# Patient Record
Sex: FEMALE | Race: WHITE | NOT HISPANIC OR LATINO | Employment: OTHER | URBAN - METROPOLITAN AREA
[De-identification: names, ages, dates, MRNs, and addresses within clinical notes are randomized per-mention and may not be internally consistent; named-entity substitution may affect disease eponyms.]

---

## 2019-10-11 ENCOUNTER — THERAPY VISIT (OUTPATIENT)
Dept: PHYSICAL THERAPY | Facility: CLINIC | Age: 64
End: 2019-10-11
Payer: COMMERCIAL

## 2019-10-11 DIAGNOSIS — M54.42 RIGHT-SIDED LOW BACK PAIN WITH BILATERAL SCIATICA: ICD-10-CM

## 2019-10-11 DIAGNOSIS — M54.41 RIGHT-SIDED LOW BACK PAIN WITH BILATERAL SCIATICA: ICD-10-CM

## 2019-10-11 PROCEDURE — 97530 THERAPEUTIC ACTIVITIES: CPT | Mod: GP | Performed by: PHYSICAL THERAPIST

## 2019-10-11 PROCEDURE — 97110 THERAPEUTIC EXERCISES: CPT | Mod: GP | Performed by: PHYSICAL THERAPIST

## 2019-10-11 PROCEDURE — 97161 PT EVAL LOW COMPLEX 20 MIN: CPT | Mod: GP | Performed by: PHYSICAL THERAPIST

## 2019-10-11 NOTE — LETTER
[facility]THLETIC [facility] PHYSICAL THERAPY  [address]  [facility] [address]  [phone]    2019    Re: Marie Puente   :   1955  MRN:  [id number]   REFERRING PHYSICIAN:   Tiera Kwong    [facility] PHYSICAL THERAPY    Date of Initial Evaluation:  10/11/2019  Visits:   1  Reason for Referral:  Right-sided low back pain with bilateral sciatica    [facility] Initial Evaluation -- Lumbar  Date: 2019  Marie Puente is a 64 year old female with a lumbar spine condition.   Referral: GP  Work mechanical stresses:    Employment status:   for [employer]  Leisure mechanical stresses: walking 4 x week (1 mile)  Functional disability score (RAHUL/STarT Back):  See flowsheets  VAS score (0-10): 3/10  Patient goals/expectations:  Reduce pain in legs and sit for longer periods without leg pain    HISTORY:  Present symptoms: Rt glut, (B) achiness in legs, Rt ant shin  Pain quality (sharp/shooting/stabbing/aching/burning/cramping):  Achy, burning   Paresthesia (yes/no):  Numbness and coldness feeling middle toes (B)  Present since (onset date): > 6 months.  MD referral date 19.   Symptoms (improving/unchanging/worsening):  worsening.   Symptoms commenced as a result of: No apparent reason   Condition occurred in the following environment:   unknown   Symptoms at onset (back/thigh/leg): Rt glut, legs  Constant symptoms (back/thigh/leg):   Intermittent symptoms (back/thigh/leg): Rt glut, legs F  Symptoms are made worse with the following: Always Sitting, Sometimes Rising, Sometimes Lying, Time of day - Sometimes PM and Always When still   Symptoms are made better with the following: Always Walking and Always On the move  Disturbed sleep (yes/no):  No   Sleeping postures (prone/sup/side R/L): sup, sides  Previous episodes (0/1-5/6-10/11+): 0   Year of first episode:   Previous history:  No  Previous treatments: None        Re: Marie Puente   :   1955    Specific Questions:  Cough/Sneeze/Strain (pos/neg): Neg  Bowel/Bladder (normal/abnormal): Normal  Gait (normal/abnormal): Normal  Medications (nil/NSAIDS/analg/steroids/anticoag/other):  Other - Cholesterol, doxycycline  Medical allergies:  Sulfa  General health (excellent/good/fair/poor):  Good  Pertinent medical history:  Lyme's  Imaging (None/Xray/MRI/Other):  None  Recent or major surgery (yes/no):  No  Night pain (yes/no): No  Accidents (yes/no): No  Unexplained weight loss (yes/no): No  Barriers at home: None  Other red flags: None    EXAMINATION    Posture:   Sitting (good/fair/poor): Fair  Standing (good/fair/poor):Fair  Lordosis (red/acc/normal): Reduced  Correction of posture (better/worse/no effect): Better  Lateral Shift (right/left/nil): Nil  Relevant (yes/no):    Other Observations:     Neurological:  Motor deficit:  Hip Flex Rt 4-/5 Lt 4/5; Quad Rt 5-/5 Lt 5/5; H-S 5/5 (B); DF Rt 4+/5 Lt 5/5; PF (calf raise) 5/5 (B)    Reflexes:  NT  Sensory deficit:  NT    Dural signs:  Neg slump (B)    Movement Loss:   Jameson Mod Min Nil Pain   Flexion    X    Extension  X      Side Gliding R  X   Rt low back   Side Gliding L   X  Rt low back     Test Movements:   During: produces, abolishes, increases, decreases, no effect, centralizing, peripheralizing   After: better, worse, no better, no worse, no effect, centralized, peripheralized          Re: Marie Puente   :   1955    Pretest symptoms standin/10 pain   Symptoms During Symptoms After ROM increased ROM decreased No Effect   FIS        Rep FIS        EIS No Effect    No Effect         Rep EIS No Effect    No Effect    X  Ext and (B) SGIS w/less pain    Inc (B) hip flex, Rt quad, and Rt DF strength     Pretest symptoms lying:     Symptoms During Symptoms After ROM increased ROM decreased No Effect   KALYN        Rep KALYN        EIL        Rep EIL        If required,  pretest symptoms:    Symptoms During Symptoms After ROM increased ROM decreased No Effect   SGIS - R        Rep SGIS - R        SGIS - L        Rep SGIS - L        Static Tests:  Sitting slouched:    Sitting erect:    Standing slouched   Standing erect:    Lying prone in extension:   Long sitting:    Other Tests: NT  Provisional Classification:  Derangement - Asymmetrical, unilateral, symptoms below knee  Principle of Management:  Education:  Posture, specificity of exercise and rationale with repeated movements, centralization  Equipment provided:  None.  Use of rolled towel for posture correction  Mechanical therapy (Y/N):  Y   Extension principle:  EIS x 10-15 reps every 2 hrs            Re: Marie Velasqueznett   :   1955    ASSESSMENT/PLAN:  Patient is a 64 year old female with lumbar complaints.    Patient has the following significant findings with corresponding treatment plan.                Diagnosis 1:  LBP    Pain -  self management, education, directional preference exercise and home program  Decreased ROM/flexibility - manual therapy, therapeutic exercise and home program  Decreased joint mobility - manual therapy, therapeutic exercise and home program  Decreased strength - therapeutic exercise, therapeutic activities and home program  Impaired muscle performance - neuro re-education and home program  Decreased function - therapeutic activities and home program  Impaired posture - neuro re-education and home program  Previous and current functional limitations:  (See Goal Flow Sheet for this information)    Short term and Long term goals: (See Goal Flow Sheet for this information)   Communication ability:  Patient appears to be able to clearly communicate and understand verbal and written communication and follow directions correctly.  Treatment Explanation - The following has been discussed with the patient:   RX ordered/plan of care, Anticipated outcomes, Possible risks and side effects    This  patient would benefit from PT intervention to resume normal activities.   Rehab potential is good.  Frequency:  1 X week, once daily  Duration:  for 6 weeks  Discharge Plan:  Achieve all LTG.  Independent in home treatment program.  Reach maximal therapeutic benefit.    Thank you for your referral.    INQUIRIES        Therapist:  Elias Corado DPT, Cert. MDT  [facility]  [address] [facility] [address]  [facility] [address]  Phone: [phone]  Fax: [phone]

## 2019-10-11 NOTE — PROGRESS NOTES
[facility] Initial Evaluation -- Lumbar    Date: October 11, 2019  Marie Puente is a 64 year old female with a lumbar spine condition.   Referral: GP  Work mechanical stresses:    Employment status:   for [email or web address]  Leisure mechanical stresses: walking 4 x week (1 mile)  Functional disability score (RAHUL/STarT Back):  See flowsheets  VAS score (0-10): 3/10  Patient goals/expectations:  Reduce pain in legs and sit for longer periods without leg pain    HISTORY:    Present symptoms: Rt glut, (B) achiness in legs, Rt ant shin  Pain quality (sharp/shooting/stabbing/aching/burning/cramping):  Achy, burning   Paresthesia (yes/no):  Numbness and coldness feeling middle toes (B)    Present since (onset date): > 6 months.  MD referral date 9.25.19.   Symptoms (improving/unchanging/worsening):  worsening.     Symptoms commenced as a result of: No apparent reason   Condition occurred in the following environment:   unknown     Symptoms at onset (back/thigh/leg): Rt glut, legs  Constant symptoms (back/thigh/leg):   Intermittent symptoms (back/thigh/leg): Rt glut, legs  F  Symptoms are made worse with the following: Always Sitting, Sometimes Rising, Sometimes Lying, Time of day - Sometimes PM and Always When still   Symptoms are made better with the following: Always Walking and Always On the move    Disturbed sleep (yes/no):  No Sleeping postures (prone/sup/side R/L): sup, sides    Previous episodes (0/1-5/6-10/11+): 0 Year of first episode:     Previous history: No  Previous treatments: None      Specific Questions:  Cough/Sneeze/Strain (pos/neg): Neg  Bowel/Bladder (normal/abnormal): Normal  Gait (normal/abnormal): Normal  Medications (nil/NSAIDS/analg/steroids/anticoag/other):  Other - Cholesterol, doxycycline  Medical allergies:  Sulfa  General health (excellent/good/fair/poor):  Good  Pertinent medical history:  Lyme's  Imaging (None/Xray/MRI/Other):  None  Recent or major surgery (yes/no):   No  Night pain (yes/no): No  Accidents (yes/no): No  Unexplained weight loss (yes/no): No  Barriers at home: None  Other red flags: None    EXAMINATION    Posture:   Sitting (good/fair/poor): Fair  Standing (good/fair/poor):Fair  Lordosis (red/acc/normal): Reduced  Correction of posture (better/worse/no effect): Better    Lateral Shift (right/left/nil): Nil  Relevant (yes/no):    Other Observations:     Neurological:    Motor deficit:  Hip Flex Rt 4-/5 Lt 4/5; Quad Rt 5-/5 Lt 5/5; H-S 5 (B); DF Rt 4+/5 Lt 5/5; PF (calf raise)  (B)    Reflexes:  NT  Sensory deficit:  NT  Dural signs:  Neg slump (B)    Movement Loss:   Jameson Mod Min Nil Pain   Flexion    X    Extension  X      Side Gliding R  X   Rt low back   Side Gliding L   X  Rt low back     Test Movements:   During: produces, abolishes, increases, decreases, no effect, centralizing, peripheralizing   After: better, worse, no better, no worse, no effect, centralized, peripheralized    Pretest symptoms standin/10 pain   Symptoms During Symptoms After ROM increased ROM decreased No Effect   FIS        Rep FIS        EIS No Effect    No Effect         Rep EIS No Effect    No Effect    X  Ext and (B) SGIS w/less pain    Inc (B) hip flex, Rt quad, and Rt DF strength     Pretest symptoms lying:     Symptoms During Symptoms After ROM increased ROM decreased No Effect   KALYN        Rep KALYN        EIL        Rep EIL        If required, pretest symptoms:    Symptoms During Symptoms After ROM increased ROM decreased No Effect   SGIS - R        Rep SGIS - R        SGIS - L        Rep SGIS - L          Static Tests:  Sitting slouched:    Sitting erect:    Standing slouched   Standing erect:    Lying prone in extension:   Long sitting:      Other Tests: NT    Provisional Classification:  Derangement - Asymmetrical, unilateral, symptoms below knee    Principle of Management:  Education:  Posture, specificity of exercise and rationale with repeated movements,  centralization  Equipment provided:  None.  Use of rolled towel for posture correction  Mechanical therapy (Y/N):  Y   Extension principle:  EIS x 10-15 reps every 2 hrs    ASSESSMENT/PLAN:    Patient is a 64 year old female with lumbar complaints.    Patient has the following significant findings with corresponding treatment plan.                Diagnosis 1:  LBP    Pain -  self management, education, directional preference exercise and home program  Decreased ROM/flexibility - manual therapy, therapeutic exercise and home program  Decreased joint mobility - manual therapy, therapeutic exercise and home program  Decreased strength - therapeutic exercise, therapeutic activities and home program  Impaired muscle performance - neuro re-education and home program  Decreased function - therapeutic activities and home program  Impaired posture - neuro re-education and home program    Previous and current functional limitations:  (See Goal Flow Sheet for this information)    Short term and Long term goals: (See Goal Flow Sheet for this information)     Communication ability:  Patient appears to be able to clearly communicate and understand verbal and written communication and follow directions correctly.  Treatment Explanation - The following has been discussed with the patient:   RX ordered/plan of care  Anticipated outcomes  Possible risks and side effects  This patient would benefit from PT intervention to resume normal activities.   Rehab potential is good.    Frequency:  1 X week, once daily  Duration:  for 6 weeks  Discharge Plan:  Achieve all LTG.  Independent in home treatment program.  Reach maximal therapeutic benefit.    Please refer to the daily flowsheet for treatment today, total treatment time and time spent performing 1:1 timed codes.

## 2019-10-14 PROBLEM — M54.41 RIGHT-SIDED LOW BACK PAIN WITH BILATERAL SCIATICA: Status: ACTIVE | Noted: 2019-10-14

## 2019-10-14 PROBLEM — M54.42 RIGHT-SIDED LOW BACK PAIN WITH BILATERAL SCIATICA: Status: ACTIVE | Noted: 2019-10-14

## 2019-10-16 ENCOUNTER — THERAPY VISIT (OUTPATIENT)
Dept: PHYSICAL THERAPY | Facility: CLINIC | Age: 64
End: 2019-10-16
Payer: COMMERCIAL

## 2019-10-16 DIAGNOSIS — M54.42 RIGHT-SIDED LOW BACK PAIN WITH BILATERAL SCIATICA: ICD-10-CM

## 2019-10-16 DIAGNOSIS — M54.41 RIGHT-SIDED LOW BACK PAIN WITH BILATERAL SCIATICA: ICD-10-CM

## 2019-10-16 PROCEDURE — 97110 THERAPEUTIC EXERCISES: CPT | Mod: GP | Performed by: PHYSICAL THERAPIST

## 2019-10-16 PROCEDURE — 97530 THERAPEUTIC ACTIVITIES: CPT | Mod: GP | Performed by: PHYSICAL THERAPIST

## 2019-10-24 ENCOUNTER — THERAPY VISIT (OUTPATIENT)
Dept: PHYSICAL THERAPY | Facility: CLINIC | Age: 64
End: 2019-10-24
Payer: COMMERCIAL

## 2019-10-24 DIAGNOSIS — M54.42 RIGHT-SIDED LOW BACK PAIN WITH BILATERAL SCIATICA: ICD-10-CM

## 2019-10-24 DIAGNOSIS — M54.41 RIGHT-SIDED LOW BACK PAIN WITH BILATERAL SCIATICA: ICD-10-CM

## 2019-10-24 PROCEDURE — 97110 THERAPEUTIC EXERCISES: CPT | Mod: GP | Performed by: PHYSICAL THERAPIST

## 2019-10-24 PROCEDURE — 97140 MANUAL THERAPY 1/> REGIONS: CPT | Mod: GP | Performed by: PHYSICAL THERAPIST

## 2019-10-24 PROCEDURE — 97530 THERAPEUTIC ACTIVITIES: CPT | Mod: GP | Performed by: PHYSICAL THERAPIST

## 2019-11-07 ENCOUNTER — THERAPY VISIT (OUTPATIENT)
Dept: PHYSICAL THERAPY | Facility: CLINIC | Age: 64
End: 2019-11-07
Payer: COMMERCIAL

## 2019-11-07 DIAGNOSIS — M54.41 RIGHT-SIDED LOW BACK PAIN WITH BILATERAL SCIATICA: ICD-10-CM

## 2019-11-07 DIAGNOSIS — M54.42 RIGHT-SIDED LOW BACK PAIN WITH BILATERAL SCIATICA: ICD-10-CM

## 2019-11-07 PROCEDURE — 97530 THERAPEUTIC ACTIVITIES: CPT | Mod: GP | Performed by: PHYSICAL THERAPIST

## 2019-11-07 PROCEDURE — 97110 THERAPEUTIC EXERCISES: CPT | Mod: GP | Performed by: PHYSICAL THERAPIST

## 2019-11-07 NOTE — PROGRESS NOTES
DISCHARGE REPORT    Progress reporting period is from 10.11.19 to 11.7.19.       SUBJECTIVE  Subjective changes noted by patient:  Pt reports pain is much better overall.  Only pain she has been feeling is just the slight burning sensation in Rt ant shin after driving but as soon as she gets out of the car it goes away.  Is still adjusting things with car seat and positions.  Has still been consistent with HEP     Current Pain level: 0/10.     Initial Pain level: 3/10.   Changes in function:  Yes (See Goal flowsheet attached for changes in current functional level)  Adverse reaction to treatment or activity: None    OBJECTIVE  Objective: L/S AROM:  Flex WNL; Ext Min loss; SG Rt Min loss Lt WNL.  Ext ROM and Rt SGIS improve after repeated EIS/EIL.  Myotomes:  5/5 (B) throughout.  Slump:  Neg (B).      ASSESSMENT/PLAN  Updated problem list and treatment plan:   Diagnosis 1:  LBP    Decreased ROM/flexibility - therapeutic exercise and home program  Decreased joint mobility - therapeutic exercise and home program  Impaired muscle performance - home program  STG/LTGs have been met or progress has been made towards goals:  Yes (See Goal flow sheet completed today.)  Assessment of Progress: The patient's condition is improving.  The patient has met all of their long term goals.  Self Management Plans:  Patient is independent in a home treatment program.  Patient is independent in self management of symptoms.  I have re-evaluated this patient and find that the nature, scope, duration and intensity of the therapy is appropriate for the medical condition of the patient.  Marie continues to require the following intervention to meet STG and LTG's:  PT intervention is no longer required to meet STG/LTG.    Recommendations:  This patient is ready to be discharged from therapy and continue their home treatment program.

## 2019-11-07 NOTE — LETTER
[facility] ATHLETIC [facility] PHYSICAL THERAPY  [address]  [facility] [address]  [phone]    2019    Re: Marie Puente   :   1955  MRN:  [id number]   REFERRING PHYSICIAN:   Tiera Kwong    [facility] ATHLETIC [facility] PHYSICAL THERAPY    Date of Initial Evaluation:  10/11/2019  Visits:   4  Reason for Referral:  Right-sided low back pain with bilateral sciatica    DISCHARGE REPORT:  Progress reporting period is from 10.11.19 to 19.       SUBJECTIVE  Subjective changes noted by patient:  Pt reports pain is much better overall.  Only pain she has been feeling is just the slight burning sensation in Rt ant shin after driving but as soon as she gets out of the car it goes away.  Is still adjusting things with car seat and positions.  Has still been consistent with HEP     Current Pain level: 0/10.   Initial Pain level: 3/10.   Changes in function:  Yes (See Goal flowsheet attached for changes in current functional level). Adverse reaction to treatment or activity: None    OBJECTIVE  Objective: L/S AROM:  Flex WNL; Ext Min loss; SG Rt Min loss Lt WNL.  Ext ROM and Rt SGIS improve after repeated EIS/EIL.  Myotomes:  5/5 (B) throughout.  Slump:  Neg (B).    ASSESSMENT/PLAN  Updated problem list and treatment plan:   Diagnosis 1:  LBP  Decreased ROM/flexibility - therapeutic exercise and home program  Decreased joint mobility - therapeutic exercise and home program  Impaired muscle performance - home program  STG/LTGs have been met or progress has been made towards goals:  Yes (See Goal flow sheet completed today.)  Assessment of Progress: The patient's condition is improving.  The patient has met all of their long term goals.  Self Management Plans:  Patient is independent in a home treatment program.  Patient is independent in self management of symptoms.  I have re-evaluated this patient and find that the nature, scope, duration and  intensity of the therapy is appropriate for the medical condition of the patient.  Marie continues to require the following intervention to meet STG and LTG's:  PT intervention is no longer required to meet STG/LTG.          Re: Marie Puente   :   1955    Recommendations:  This patient is ready to be discharged from therapy and continue their home treatment program.    Thank you for your referral.    INQUIRIES        Therapist:   Elias Corado DPT, Cert. MDT  [facility] PHYSICAL THERAPY  [address] [facility] [address]  [facility] [address]  Phone: [phone]  Fax: [phone]

## 2019-12-12 ENCOUNTER — THERAPY VISIT (OUTPATIENT)
Dept: PHYSICAL THERAPY | Facility: CLINIC | Age: 64
End: 2019-12-12
Payer: COMMERCIAL

## 2019-12-12 DIAGNOSIS — M25.561 ACUTE PAIN OF RIGHT KNEE: ICD-10-CM

## 2019-12-12 DIAGNOSIS — M54.42 RIGHT-SIDED LOW BACK PAIN WITH BILATERAL SCIATICA: Primary | ICD-10-CM

## 2019-12-12 DIAGNOSIS — M54.41 RIGHT-SIDED LOW BACK PAIN WITH BILATERAL SCIATICA: Primary | ICD-10-CM

## 2019-12-12 PROCEDURE — 97110 THERAPEUTIC EXERCISES: CPT | Mod: GP | Performed by: PHYSICAL THERAPIST

## 2019-12-12 PROCEDURE — 97530 THERAPEUTIC ACTIVITIES: CPT | Mod: GP | Performed by: PHYSICAL THERAPIST

## 2019-12-12 NOTE — PROGRESS NOTES
PROGRESS  REPORT    Progress reporting period is from 11.7.19 to 12.12.19.       SUBJECTIVE  Subjective changes noted by patient:  Pt returns to PT after last seen 5 weeks ago.  Reports that things had been going well and still doing stretch 3 x daily.  Was having no real Rt lower leg pain.  Then about 2 weeks ago had insidious onset of Rt knee pain and swelling for JORGE.  Pain has resolved with elevating and icing but is still feeling tightness and discomfort in back of knee along with some residual swelling.  Feels it with walking, going up/down stairs and rising from chair.    Current Pain level: 2/10.     Initial Pain level: 3/10.   Changes in function:  Yes (See Goal flowsheet attached for changes in current functional level)  Adverse reaction to treatment or activity: None    OBJECTIVE  Objective: L/S AROM:  Flex WNL; Ext WNL; SG WNL (B).  Strength:  Quad Rt 4+/5 (+), Lt 5/5; H-S Rt 5-/5 (+), Lt 5/5.  ROM:  Rt Knee 5-122 deg, Lt knee 0-138 deg.  Repeated movements:  Lumbar EIS - NE, NE, Inc H-S strength and knee flex ROM (125 deg).  NE Quad strength, Ext ROM.  Active Knee Ext - NE, NE, Inc Quad strength, Knee ROM 0-130 deg.  Seated knee ext w/self OP - NE, NE, Inc ROM (2-0-132 deg).      ASSESSMENT/PLAN  Updated problem list and treatment plan:   Diagnosis 1:  LBP/Rt Knee Pain    Pain -  self management, education, directional preference exercise and home program  Decreased ROM/flexibility - manual therapy, therapeutic exercise and home program  Decreased joint mobility - manual therapy, therapeutic exercise and home program  Decreased strength - therapeutic exercise, therapeutic activities and home program  Impaired muscle performance - neuro re-education and home program  Decreased function - therapeutic activities and home program  STG/LTGs have been met or progress has been made towards goals:  Yes (See Goal flow sheet completed today.)  Assessment of Progress: The patient has had set backs in their  progress.  Self Management Plans:  Patient has been instructed in a home treatment program.  Patient  has been instructed in self management of symptoms.  I have re-evaluated this patient and find that the nature, scope, duration and intensity of the therapy is appropriate for the medical condition of the patient.  Marie continues to require the following intervention to meet STG and LTG's:  PT    Recommendations:  This patient would benefit from continued therapy.     Frequency:  1 X week, once daily  Duration:  for 2 weeks tapering to 2 x month x 1 month

## 2019-12-12 NOTE — LETTER
[facility] ATHLETIC [facility] PHYSICAL THER  [address]  [place] [address]  [phone]    2019    Re: Marie Puente   :   1955  MRN:  [id number]   REFERRING PHYSICIAN:   Tiera Kwong    [facility] ATHLETIC [facility] PHYSICAL THER  Date of Initial Evaluation: 10/11/2019  Visits:  Rxs Used: 5  Reason for Referral:     Acute pain of right knee  Right-sided low back pain with bilateral sciatica    EVALUATION SUMMARY    PROGRESS  REPORT  Progress reporting period is from 19 to 19.       SUBJECTIVE  Subjective changes noted by patient:  Pt returns to PT after last seen 5 weeks ago.  Reports that things had been going well and still doing stretch 3 x daily.  Was having no real Rt lower leg pain.  Then about 2 weeks ago had insidious onset of Rt knee pain and swelling for JORGE.  Pain has resolved with elevating and icing but is still feeling tightness and discomfort in back of knee along with some residual swelling.  Feels it with walking, going up/down stairs and rising from chair.    Current Pain level: 2/10.     Initial Pain level: 3/10.   Changes in function:  Yes (See Goal flowsheet attached for changes in current functional level)  Adverse reaction to treatment or activity: None    OBJECTIVE  Objective: L/S AROM:  Flex WNL; Ext WNL; SG WNL (B).  Strength:  Quad Rt 4+/5 (+), Lt 5/5; H-S Rt 5-/5 (+), Lt 5/5.  ROM:  Rt Knee 5-122 deg, Lt knee 0-138 deg.  Repeated movements:  Lumbar EIS - NE, NE, Inc H-S strength and knee flex ROM (125 deg).  NE Quad strength, Ext ROM.  Active Knee Ext - NE, NE, Inc Quad strength, Knee ROM 0-130 deg.  Seated knee ext w/self OP - NE, NE, Inc ROM (2-0-132 deg).      ASSESSMENT/PLAN  Updated problem list and treatment plan:   Diagnosis 1:  LBP/Rt Knee Pain    Pain -  self management, education, directional preference exercise and home program  Decreased ROM/flexibility - manual therapy, therapeutic  exercise and home program  Decreased joint mobility - manual therapy, therapeutic exercise and home program  Decreased strength - therapeutic exercise, therapeutic activities and home program  Impaired muscle performance - neuro re-education and home program      Re: Marie Puente   :   1955    Decreased function - therapeutic activities and home program  STG/LTGs have been met or progress has been made towards goals:  Yes (See Goal flow sheet completed today.)  Assessment of Progress: The patient has had set backs in their progress.  Self Management Plans:  Patient has been instructed in a home treatment program.  Patient  has been instructed in self management of symptoms.  I have re-evaluated this patient and find that the nature, scope, duration and intensity of the therapy is appropriate for the medical condition of the patient.  Marie continues to require the following intervention to meet STG and LTG's:  PT    Recommendations:  This patient would benefit from continued therapy.     Frequency:  1 X week, once daily  Duration:  for 2 weeks tapering to 2 x month x 1 month        Thank you for your referral.    INQUIRIES  Therapist: Ritchie Corado, PT, Cert MDT  [facility] PH[facility]ICAL THER  [address] [facility] [address]  [facility] [address]  Phone: [phone]  Fax: [phone]

## 2019-12-19 ENCOUNTER — THERAPY VISIT (OUTPATIENT)
Dept: PHYSICAL THERAPY | Facility: CLINIC | Age: 64
End: 2019-12-19
Payer: COMMERCIAL

## 2019-12-19 DIAGNOSIS — M54.41 RIGHT-SIDED LOW BACK PAIN WITH BILATERAL SCIATICA: ICD-10-CM

## 2019-12-19 DIAGNOSIS — M54.42 RIGHT-SIDED LOW BACK PAIN WITH BILATERAL SCIATICA: ICD-10-CM

## 2019-12-19 DIAGNOSIS — M25.561 ACUTE PAIN OF RIGHT KNEE: ICD-10-CM

## 2019-12-19 PROCEDURE — 97110 THERAPEUTIC EXERCISES: CPT | Mod: GP | Performed by: PHYSICAL THERAPIST

## 2021-02-05 ENCOUNTER — THERAPY VISIT (OUTPATIENT)
Dept: PHYSICAL THERAPY | Facility: CLINIC | Age: 66
End: 2021-02-05
Payer: COMMERCIAL

## 2021-02-05 DIAGNOSIS — M75.41 IMPINGEMENT SYNDROME OF SHOULDER REGION, RIGHT: ICD-10-CM

## 2021-02-05 PROCEDURE — 97110 THERAPEUTIC EXERCISES: CPT | Mod: GP | Performed by: PHYSICAL THERAPIST

## 2021-02-05 PROCEDURE — 97161 PT EVAL LOW COMPLEX 20 MIN: CPT | Mod: GP | Performed by: PHYSICAL THERAPIST

## 2021-02-05 NOTE — PROGRESS NOTES
[facility] Initial Evaluation  Subjective:  Patient is referred with c/o R shoulder pain which began in the summer of 2020 after a long session of kayaking. She continues to note pain with reaching out to the side or overhead and notes sharp, catching pain. No dx testing to this point. DOI 7/4/20.    The history is provided by the patient.   Therapist Generated HPI Evaluation         Type of problem:  Right shoulder.    This is a new condition.  Condition occurred with:  Repetition/overuse.  Where condition occurred: during recreation/sport.  Patient reports pain:  In the joint and lateral.  Pain is described as sharp and is intermittent.  Pain is worse during the day.  Since onset symptoms are unchanged.  Associated symptoms:  Catching, loss of strength and painful arc. Symptoms are exacerbated by using arm at shoulder level, using arm overhead, using arm behind back and lifting  and relieved by rest.      Barriers include:  None as reported by patient.    Patient Health History  Marie Puente being seen for R shoulder pain.          Pain is reported as 8/10 on pain scale.  General health as reported by patient is good.  Pertinent medical history includes: asthma.   Red flags:  None as reported by patient.   Other medical allergies details: sulfa.   Surgeries include:  None.                                               Objective:  System                   Shoulder Evaluation:  ROM:  AROM:  normal                            PROM:  normal                                Strength:    Flexion: Left:5/5   Pain:    Right: 5/5      Pain:  +    Abduction:  Left: 5/5  Pain:    Right: 5/5      Pain:+    Internal Rotation:  Left:5/5     Pain:    Right: 5/5     Pain:  External Rotation:   Left:5/5     Pain:   Right:3+/5      Pain:++            Stability Testing:  not assessed      Special Tests:      Right shoulder positive for the following special tests:Impingement  Palpation:      Right shoulder  tenderness present at: Supraspinatus and Infraspinatus  Mobility Tests:  normal                                                 General     ROS    Assessment/Plan:    Patient is a 65 year old female with right side shoulder complaints.    Patient has the following significant findings with corresponding treatment plan.                Diagnosis 1:  Right shoulder impingement syndrome  Pain -  splint/taping/bracing/orthotics, self management, education and home program  Decreased strength - therapeutic exercise, therapeutic activities and home program  Impaired muscle performance - neuro re-education and home program  Decreased function - therapeutic activities and home program    Therapy Evaluation Codes:   1) History comprised of:   Personal factors that impact the plan of care:      Time since onset of symptoms.    Comorbidity factors that impact the plan of care are:      None.     Medications impacting care: None.  2) Examination of Body Systems comprised of:   Body structures and functions that impact the plan of care:      Shoulder.   Activity limitations that impact the plan of care are:      Dressing, Lifting and reaching overhead or out to the side.  3) Clinical presentation characteristics are:   Stable/Uncomplicated.  4) Decision-Making    Low complexity using standardized patient assessment instrument and/or measureable assessment of functional outcome.  Cumulative Therapy Evaluation is: Low complexity.    Previous and current functional limitations:  (See Goal Flow Sheet for this information)    Short term and Long term goals: (See Goal Flow Sheet for this information)     Communication ability:  Patient appears to be able to clearly communicate and understand verbal and written communication and follow directions correctly.  Treatment Explanation - The following has been discussed with the patient:   RX ordered/plan of care  Anticipated outcomes  Possible risks and side effects  This patient would  benefit from PT intervention to resume normal activities.   Rehab potential is excellent.    Frequency:  1 X week, once daily  Duration:  for 6 weeks  Discharge Plan:  Achieve all LTG.  Independent in home treatment program.  Reach maximal therapeutic benefit.    Please refer to the daily flowsheet for treatment today, total treatment time and time spent performing 1:1 timed codes.

## 2021-02-05 NOTE — LETTER
[facility] PHYSICAL THERAPY  [address]   [place] [address]  [phone]    2021    Re: Marie Puente   :   1955  MRN:  [id number]   REFERRING PHYSICIAN:   Tiera Kwong    [facility] PHYSICAL THERAPY    Date of Initial Evaluation:  2021  Visits:  Rxs Used: 1  Reason for Referral:  Impingement syndrome of shoulder region, right    EVALUATION SUMMARY    [facility] Initial Evaluation  Subjective:  Patient is referred with c/o R shoulder pain which began in the summer of  after a long session of kayaking. She continues to note pain with reaching out to the side or overhead and notes sharp, catching pain. No dx testing to this point. DOI 20.  The history is provided by the patient.   Patient Health History  Pain is reported as 8/10 on pain scale.  General health as reported by patient is good.  Pertinent medical history includes: asthma, cancer and other (High Cholesterol, Diverticulitis).   Medical allergies: other (Sulfa).   Surgeries include:  Cancer surgery (Skin/Mohs).    Current medications:  Other (Atorvastatin, Low dose Asprine).    Current occupation is retired.   Other job/home tasks details: Caring for grandchildren.     Therapist Generated HPI Evaluation  Type of problem:  Right shoulder.  This is a new condition.  Condition occurred with:  Repetition/overuse.  Where condition occurred: during recreation/sport.  Patient reports pain:  In the joint and lateral.  Pain is described as sharp and is intermittent.  Pain is worse during the day.  Since onset symptoms are unchanged.  Associated symptoms:  Catching, loss of strength and painful arc. Symptoms are exacerbated by using arm at shoulder level, using arm overhead, using arm behind back and lifting  and relieved by rest.  Barriers include:  None as reported by patient.    Patient Health History  Marie GRECO  Kwame being seen for R shoulder pain.    Pain is reported as 8/10 on pain scale.  General health as reported by patient is good.  Re: Marie Puente   :   1955      Pertinent medical history includes: asthma.   Red flags:  None as reported by patient.  Other medical allergies details: sulfa.   Surgeries include:  None.        Shoulder Evaluation:  AROM:  normal  PROM:  normal  Strength:    Flexion: Left:5/5   Pain:    Right: 5/5      Pain:  +  Abduction:  Left: 5/5  Pain:    Right: 5/5      Pain:+  Internal Rotation:  Left:5/5     Pain:    Right: 5/5     Pain:  External Rotation:   Left:5/5     Pain:   Right:3+/5      Pain:++    Stability Testing:  not assessed  Special Tests:    Right shoulder positive for the following special tests:Impingement  Palpation:    Right shoulder tenderness present at: Supraspinatus and Infraspinatus  Mobility Tests:  normal    Assessment/Plan:    Patient is a 65 year old female with right side shoulder complaints.    Patient has the following significant findings with corresponding treatment plan.                Diagnosis 1:  Right shoulder impingement syndrome  Pain -  splint/taping/bracing/orthotics, self management, education and home program  Decreased strength - therapeutic exercise, therapeutic activities and home program  Impaired muscle performance - neuro re-education and home program  Decreased function - therapeutic activities and home program    Therapy Evaluation Codes:   1) History comprised of:   Personal factors that impact the plan of care:      Time since onset of symptoms.    Comorbidity factors that impact the plan of care are:      None.     Medications impacting care: None.  2) Examination of Body Systems comprised of:   Body structures and functions that impact the plan of care:      Shoulder.   Activity limitations that impact the plan of care are:      Dressing, Lifting and reaching overhead or out to the side.  3) Clinical presentation  characteristics are:   Stable/Uncomplicated.  4) Decision-Making    Low complexity using standardized patient assessment instrument and/or measureable assessment of functional outcome.  Cumulative Therapy Evaluation is: Low complexity.  Previous and current functional limitations:  (See Goal Flow Sheet for this information)    Short term and Long term goals: (See Goal Flow Sheet for this information)   Communication ability:  Patient appears to be able to clearly communicate and  Re: Marie Puente   :   1955        understand verbal and written communication and follow directions correctly.  Treatment Explanation - The following has been discussed with the patient:   RX ordered/plan of care, Anticipated outcomes, Possible risks and side effects    This patient would benefit from PT intervention to resume normal activities.   Rehab potential is excellent.  Frequency:  1 X week, once daily  Duration:  for 6 weeks  Discharge Plan:  Achieve all LTG.  Independent in home treatment program.  Reach maximal therapeutic benefit.    Thank you for your referral.      INQUIRIES  Therapist: Mateus Hay, PT  [facility] [place] [facility]  [address] [facility] [address]  [place] [address]  Phone: [phone]  Fax: [phone]

## 2021-02-05 NOTE — PROGRESS NOTES
[facility] Initial Evaluation  Subjective:    Patient Health History         Pain is reported as 8/10 on pain scale.  General health as reported by patient is good.  Pertinent medical history includes: asthma, cancer and other (High Cholesterol, Diverticulitis).     Medical allergies: other (Sulfa).   Surgeries include:  Cancer surgery (Skin/Mohs).    Current medications:  Other (Atorvastatin, Low dose Asprine).    Current occupation is retired.      Other job/home tasks details: Caring for grandchildren.                                  Objective:  System    Physical Exam    General     ROS    Assessment/Plan:

## 2021-02-19 ENCOUNTER — THERAPY VISIT (OUTPATIENT)
Dept: PHYSICAL THERAPY | Facility: CLINIC | Age: 66
End: 2021-02-19
Payer: COMMERCIAL

## 2021-02-19 DIAGNOSIS — M75.41 IMPINGEMENT SYNDROME OF SHOULDER REGION, RIGHT: ICD-10-CM

## 2021-02-19 PROCEDURE — 97110 THERAPEUTIC EXERCISES: CPT | Mod: GP | Performed by: PHYSICAL THERAPY ASSISTANT

## 2021-02-25 ENCOUNTER — THERAPY VISIT (OUTPATIENT)
Dept: PHYSICAL THERAPY | Facility: CLINIC | Age: 66
End: 2021-02-25
Payer: COMMERCIAL

## 2021-02-25 DIAGNOSIS — M75.41 IMPINGEMENT SYNDROME OF SHOULDER REGION, RIGHT: ICD-10-CM

## 2021-02-25 PROCEDURE — 97110 THERAPEUTIC EXERCISES: CPT | Mod: GP | Performed by: PHYSICAL THERAPY ASSISTANT

## 2021-03-05 ENCOUNTER — THERAPY VISIT (OUTPATIENT)
Dept: PHYSICAL THERAPY | Facility: CLINIC | Age: 66
End: 2021-03-05
Payer: COMMERCIAL

## 2021-03-05 DIAGNOSIS — M75.41 IMPINGEMENT SYNDROME OF SHOULDER REGION, RIGHT: ICD-10-CM

## 2021-03-05 PROCEDURE — 97110 THERAPEUTIC EXERCISES: CPT | Mod: GP | Performed by: PHYSICAL THERAPY ASSISTANT

## 2021-03-11 ENCOUNTER — THERAPY VISIT (OUTPATIENT)
Dept: PHYSICAL THERAPY | Facility: CLINIC | Age: 66
End: 2021-03-11
Payer: COMMERCIAL

## 2021-03-11 DIAGNOSIS — M75.41 IMPINGEMENT SYNDROME OF SHOULDER REGION, RIGHT: ICD-10-CM

## 2021-03-11 PROCEDURE — 97110 THERAPEUTIC EXERCISES: CPT | Mod: GP | Performed by: PHYSICAL THERAPY ASSISTANT

## 2021-03-19 ENCOUNTER — THERAPY VISIT (OUTPATIENT)
Dept: PHYSICAL THERAPY | Facility: CLINIC | Age: 66
End: 2021-03-19
Payer: COMMERCIAL

## 2021-03-19 DIAGNOSIS — M75.41 IMPINGEMENT SYNDROME OF SHOULDER REGION, RIGHT: ICD-10-CM

## 2021-03-19 PROCEDURE — 97110 THERAPEUTIC EXERCISES: CPT | Mod: GP | Performed by: PHYSICAL THERAPY ASSISTANT

## 2021-03-26 ENCOUNTER — THERAPY VISIT (OUTPATIENT)
Dept: PHYSICAL THERAPY | Facility: CLINIC | Age: 66
End: 2021-03-26
Payer: COMMERCIAL

## 2021-03-26 DIAGNOSIS — M75.41 IMPINGEMENT SYNDROME OF SHOULDER REGION, RIGHT: Primary | ICD-10-CM

## 2021-03-26 PROCEDURE — 97110 THERAPEUTIC EXERCISES: CPT | Mod: GP | Performed by: PHYSICAL THERAPIST

## 2021-03-26 PROCEDURE — 97530 THERAPEUTIC ACTIVITIES: CPT | Mod: GP | Performed by: PHYSICAL THERAPIST

## 2021-04-02 ENCOUNTER — THERAPY VISIT (OUTPATIENT)
Dept: PHYSICAL THERAPY | Facility: CLINIC | Age: 66
End: 2021-04-02
Payer: COMMERCIAL

## 2021-04-02 DIAGNOSIS — M75.41 IMPINGEMENT SYNDROME OF SHOULDER REGION, RIGHT: ICD-10-CM

## 2021-04-02 PROCEDURE — 97530 THERAPEUTIC ACTIVITIES: CPT | Mod: GP | Performed by: PHYSICAL THERAPY ASSISTANT

## 2021-04-02 PROCEDURE — 97110 THERAPEUTIC EXERCISES: CPT | Mod: GP | Performed by: PHYSICAL THERAPY ASSISTANT

## 2021-04-08 ENCOUNTER — THERAPY VISIT (OUTPATIENT)
Dept: PHYSICAL THERAPY | Facility: CLINIC | Age: 66
End: 2021-04-08
Payer: COMMERCIAL

## 2021-04-08 DIAGNOSIS — M75.41 IMPINGEMENT SYNDROME OF SHOULDER REGION, RIGHT: ICD-10-CM

## 2021-04-08 PROCEDURE — 97530 THERAPEUTIC ACTIVITIES: CPT | Mod: GP | Performed by: PHYSICAL THERAPY ASSISTANT

## 2021-04-08 PROCEDURE — 97110 THERAPEUTIC EXERCISES: CPT | Mod: GP | Performed by: PHYSICAL THERAPY ASSISTANT

## 2021-04-23 ENCOUNTER — THERAPY VISIT (OUTPATIENT)
Dept: PHYSICAL THERAPY | Facility: CLINIC | Age: 66
End: 2021-04-23
Payer: COMMERCIAL

## 2021-04-23 DIAGNOSIS — M75.41 IMPINGEMENT SYNDROME OF SHOULDER REGION, RIGHT: ICD-10-CM

## 2021-04-23 PROCEDURE — 97110 THERAPEUTIC EXERCISES: CPT | Mod: GP | Performed by: PHYSICAL THERAPY ASSISTANT

## 2021-04-23 PROCEDURE — 97530 THERAPEUTIC ACTIVITIES: CPT | Mod: GP | Performed by: PHYSICAL THERAPY ASSISTANT

## 2021-04-23 NOTE — PROGRESS NOTES
Subjective:  HPI  Physical Exam                    Objective:  System    Physical Exam    General     ROS    Assessment/Plan: DISCHARGE  REPORT    Progress reporting period is from 2/4/2021 to 4/23/2021..       SUBJECTIVE   Pt reports minimal pain in shldr with daily activities especially with OH reaching. When shldr is a little sore is able to decrease her pain with IR stretching. Is able to lift grandchildren with minimal pain now.     Current pain level is 0/10 Current Pain level: 0/10.      Initial Pain level: 8/10.   Changes in function:  Yes (See Goal flowsheet attached for changes in current functional level)  Adverse reaction to treatment or activity: None    OBJECTIVE    Objective: AROM WNL with no PDM with abd today. Strength WNL and painfree with resisted testing. Demonstrates good technique with her HEP. Has been instructed in self progression of wts with her strengthening ex.      ASSESSMENT/PLAN  Updated problem list and treatment plan: Diagnosis 1:  R shoulder pain   STG/LTGs have been met or progress has been made towards goals:  Yes (See Goal flow sheet completed today.)  Assessment of Progress: The patient's condition is improving.  Self Management Plans:  Patient has been instructed in a home treatment program.  Patient is independent in a home treatment program.  Patient  has been instructed in self management of symptoms.  Patient is independent in self management of symptoms.  I have re-evaluated this patient and find that the nature, scope, duration and intensity of the therapy is appropriate for the medical condition of the patient.  Marie continues to require the following intervention to meet STG and LTG's:  PT intervention is no longer required to meet STG/LTG.    Recommendations:  This patient is ready to be discharged from therapy and continue their home treatment program.  The progress note/discharge summary was written in collaboration with and reviewed by the physical  therapist.    Please refer to the daily flowsheet for treatment today, total treatment time and time spent performing 1:1 timed codes.

## 2021-04-30 ENCOUNTER — IMMUNIZATION (OUTPATIENT)
Dept: NURSING | Facility: CLINIC | Age: 66
End: 2021-04-30
Payer: COMMERCIAL

## 2021-04-30 PROCEDURE — 91300 PR COVID VAC PFIZER DIL RECON 30 MCG/0.3 ML IM: CPT

## 2021-04-30 PROCEDURE — 0001A PR COVID VAC PFIZER DIL RECON 30 MCG/0.3 ML IM: CPT

## 2021-05-08 ENCOUNTER — HEALTH MAINTENANCE LETTER (OUTPATIENT)
Age: 66
End: 2021-05-08

## 2021-05-21 ENCOUNTER — IMMUNIZATION (OUTPATIENT)
Dept: NURSING | Facility: CLINIC | Age: 66
End: 2021-05-21
Payer: COMMERCIAL

## 2021-05-21 PROCEDURE — 91300 PR COVID VAC PFIZER DIL RECON 30 MCG/0.3 ML IM: CPT

## 2021-05-21 PROCEDURE — 0002A PR COVID VAC PFIZER DIL RECON 30 MCG/0.3 ML IM: CPT

## 2021-06-02 ENCOUNTER — RECORDS - HEALTHEAST (OUTPATIENT)
Dept: ADMINISTRATIVE | Facility: CLINIC | Age: 66
End: 2021-06-02

## 2021-10-23 ENCOUNTER — HEALTH MAINTENANCE LETTER (OUTPATIENT)
Age: 66
End: 2021-10-23

## 2022-06-04 ENCOUNTER — HEALTH MAINTENANCE LETTER (OUTPATIENT)
Age: 67
End: 2022-06-04

## 2022-07-29 ENCOUNTER — TRANSCRIBE ORDERS (OUTPATIENT)
Dept: OTHER | Age: 67
End: 2022-07-29

## 2022-07-29 DIAGNOSIS — M25.521 RIGHT ELBOW PAIN: Primary | ICD-10-CM

## 2022-07-29 DIAGNOSIS — M77.8 RIGHT ELBOW TENDONITIS: ICD-10-CM

## 2022-08-10 ENCOUNTER — THERAPY VISIT (OUTPATIENT)
Dept: PHYSICAL THERAPY | Facility: CLINIC | Age: 67
End: 2022-08-10
Attending: PHYSICIAN ASSISTANT
Payer: COMMERCIAL

## 2022-08-10 DIAGNOSIS — M25.521 RIGHT ELBOW PAIN: ICD-10-CM

## 2022-08-10 PROCEDURE — 97161 PT EVAL LOW COMPLEX 20 MIN: CPT | Mod: GP | Performed by: PHYSICAL THERAPIST

## 2022-08-10 PROCEDURE — 97110 THERAPEUTIC EXERCISES: CPT | Mod: GP | Performed by: PHYSICAL THERAPIST

## 2022-08-10 NOTE — PROGRESS NOTES
Physical Therapy Initial Evaluation  Subjective:  The history is provided by the patient.   Therapist Generated HPI Evaluation  Problem details: Started in February.  Possibly from holding grandchildren.  MD referral 7/29/2022..         Type of problem:  Right elbow.    This is a chronic condition.  Condition occurred with:  Repetition/overuse.  Where condition occurred: at home.  Patient reports pain:  Forearm, medial and medial epicondyle.  Pain is described as aching and cramping and is intermittent.  Pain radiates to:  Wrist and hand. Pain is worse during the night.  Since onset symptoms are unchanged.  Associated symptoms:  Loss of strength. Symptoms are exacerbated by certain positions (holding grandchild;  open jars)  and relieved by rest and ice (massage).          Patient Health History  Marie Puente being seen for R elbow pain.     Problem began: 2/14/2022.   Problem occurred: lifting babies   Pain is reported as 4/10 on pain scale.  General health as reported by patient is good.  Pertinent medical history includes: asthma and cancer.      Other medical allergies details: sulfa.   Surgeries include:  Cancer surgery. Other surgery history details: basel cell.     Other medications details: cholesterol.    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   Primary job tasks include:  Lifting/carrying, prolonged sitting and prolonged standing.   Other job/home tasks details: care for grandchildren.                                  Objective:  Standing Alignment:      Shoulder/UE:  Normal                                            ROM:  AROM:  normal            Supination Elbow/Wrist:  Right: slight pain    Radial Deviation:  Right: slight pain    PROM:  normal                        Strength:    Flexion Elbow:  Right: 5/5  Pain:-  Extension Elbow: Right: 5/5  Pain:-  Flexion Wrist:  Right: 5/5  Pain:-  Extension Wrist: Right: 5/5  Pain:-  Supination Elbow/Wrist: Right: 5-/5  Pain:+  Pronation  Elbow/Wrist: Right: 5/5  Pain:-  Radial Deviation:  Right: 5-/5  Pain:+  :  Dominance: Right   Left: 65      Right: 57  Ligament Testing:normal        Special Testing:      Right wrist/elbow positive for the following special tests: Medial Epicondylitis and FinkelsteinRight wrist/elbow negative for the following special tests: Phalen's  Palpation:  Palpation elbow/wrist: 1st MCP joint on R.    Right wrist/elbow tenderness present at:Medial Epicondyle and Wrist Flexors                                General     ROS    Assessment/Plan:    Patient is a 67 year old female with right side elbow complaints.    Patient has the following significant findings with corresponding treatment plan.                Diagnosis 1:  R elbow pain  Pain -  hot/cold therapy, manual therapy, self management, education, directional preference exercise and home program  Decreased ROM/flexibility - manual therapy and therapeutic exercise  Decreased strength - therapeutic exercise and therapeutic activities  Inflammation - cold therapy and self management/home program  Decreased function - therapeutic activities    Therapy Evaluation Codes:   1) History comprised of:   Personal factors that impact the plan of care:      None.    Comorbidity factors that impact the plan of care are:      Asthma and Cancer.     Medications impacting care: None.  2) Examination of Body Systems comprised of:   Body structures and functions that impact the plan of care:      Elbow.   Activity limitations that impact the plan of care are:      Lifting.  3) Clinical presentation characteristics are:   Stable/Uncomplicated.  4) Decision-Making    Low complexity using standardized patient assessment instrument and/or measureable assessment of functional outcome.  Cumulative Therapy Evaluation is: Low complexity.    Previous and current functional limitations:  (See Goal Flow Sheet for this information)    Short term and Long term goals: (See Goal Flow Sheet  for this information)     Communication ability:  Patient appears to be able to clearly communicate and understand verbal and written communication and follow directions correctly.  Treatment Explanation - The following has been discussed with the patient:   RX ordered/plan of care  Anticipated outcomes  Possible risks and side effects  This patient would benefit from PT intervention to resume normal activities.   Rehab potential is good.    Frequency:  1 X week, once daily  Duration:  for 8 weeks  Discharge Plan:  Achieve all LTG.  Independent in home treatment program.  Reach maximal therapeutic benefit.    Please refer to the daily flowsheet for treatment today, total treatment time and time spent performing 1:1 timed codes.

## 2022-08-10 NOTE — PROGRESS NOTES
[facility]    OUTPATIENT Physical Therapy ORTHOPEDIC EVALUATION  PLAN OF TREATMENT FOR OUTPATIENT REHABILITATION  (COMPLETE FOR INITIAL CLAIMS ONLY)  Patient's Last Name, First Name, M.I.  YOB: 1955  Marie Puente    Provider s Name:  [facility]   Medical Record No.  [id number]   Start of Care Date:  08/10/22   Onset Date:   07/29/22 (MD referral)   Type:     _X__PT   ___OT Medical Diagnosis:    Encounter Diagnosis   Name Primary?    Right elbow pain         Treatment Diagnosis:  R elbow pain        Goals:     08/10/22 0500   Body Part   Goals listed below are for R elbow   Goal #1   Goal #1 lifting/carrying   Previous Functional Level No restrictions   Current Functional Level Can lift   Performance level grandchildren with 4/10 pain   STG Target Performance Lift an item to counter height weighing;Carry an item weighing   Performance level grandchildren with 2/10 pain   Rationale for safe care of infant/toddler   Due date 09/07/22   LTG Target Performance Carry an item weighing;Lift an item off floor to seat height weighing   Performance Level grandchildren without pain   Rationale for safe care of infant/toddler   Due date 10/05/22       Therapy Frequency:  1x/ week  Predicted Duration of Therapy Intervention:  8 week    Luc Landis, PT                 I CERTIFY THE NEED FOR THESE SERVICES FURNISHED UNDER        THIS PLAN OF TREATMENT AND WHILE UNDER MY CARE     (Physician attestation of this document indicates review and certification of the therapy plan).                     Certification Date From:  08/10/22   Certification Date To:  10/08/22    Referring Provider:  Tiera Kwong    Initial Assessment        See Epic Evaluation SOC Date: 08/10/22

## 2022-08-18 ENCOUNTER — THERAPY VISIT (OUTPATIENT)
Dept: PHYSICAL THERAPY | Facility: CLINIC | Age: 67
End: 2022-08-18
Payer: COMMERCIAL

## 2022-08-18 DIAGNOSIS — M25.521 RIGHT ELBOW PAIN: Primary | ICD-10-CM

## 2022-08-18 PROCEDURE — 97140 MANUAL THERAPY 1/> REGIONS: CPT | Mod: GP | Performed by: PHYSICAL THERAPIST

## 2022-08-18 PROCEDURE — 97112 NEUROMUSCULAR REEDUCATION: CPT | Mod: GP | Performed by: PHYSICAL THERAPIST

## 2022-08-18 PROCEDURE — 97110 THERAPEUTIC EXERCISES: CPT | Mod: GP | Performed by: PHYSICAL THERAPIST

## 2022-09-01 ENCOUNTER — THERAPY VISIT (OUTPATIENT)
Dept: PHYSICAL THERAPY | Facility: CLINIC | Age: 67
End: 2022-09-01
Payer: COMMERCIAL

## 2022-09-01 DIAGNOSIS — M25.521 RIGHT ELBOW PAIN: Primary | ICD-10-CM

## 2022-09-01 PROCEDURE — 97140 MANUAL THERAPY 1/> REGIONS: CPT | Mod: GP | Performed by: PHYSICAL THERAPIST

## 2022-09-01 PROCEDURE — 97110 THERAPEUTIC EXERCISES: CPT | Mod: GP | Performed by: PHYSICAL THERAPIST

## 2022-09-28 ENCOUNTER — THERAPY VISIT (OUTPATIENT)
Dept: PHYSICAL THERAPY | Facility: CLINIC | Age: 67
End: 2022-09-28
Payer: COMMERCIAL

## 2022-09-28 DIAGNOSIS — M25.521 RIGHT ELBOW PAIN: Primary | ICD-10-CM

## 2022-09-28 PROCEDURE — 97140 MANUAL THERAPY 1/> REGIONS: CPT | Mod: GP | Performed by: PHYSICAL THERAPIST

## 2022-09-28 PROCEDURE — 97110 THERAPEUTIC EXERCISES: CPT | Mod: GP | Performed by: PHYSICAL THERAPIST

## 2022-10-06 ENCOUNTER — THERAPY VISIT (OUTPATIENT)
Dept: PHYSICAL THERAPY | Facility: CLINIC | Age: 67
End: 2022-10-06
Payer: COMMERCIAL

## 2022-10-06 DIAGNOSIS — M25.521 RIGHT ELBOW PAIN: Primary | ICD-10-CM

## 2022-10-06 PROCEDURE — 97140 MANUAL THERAPY 1/> REGIONS: CPT | Mod: GP | Performed by: PHYSICAL THERAPIST

## 2022-10-06 PROCEDURE — 97110 THERAPEUTIC EXERCISES: CPT | Mod: GP | Performed by: PHYSICAL THERAPIST

## 2022-10-06 NOTE — LETTER
[facility]  [address] [place] [address]  [address]   [place] [address]  [phone]    2022    Re: Marie Puente   :   1955  MRN:  [id number]   REFERRING PHYSICIAN:   Tiera STACY [facility] [place]    Date of Initial Evaluation:  8/10/2022  Visits:   5  Reason for Referral:  Right elbow pain    PROGRESS  REPORT  Progress reporting period is from 8/10/2022 to 10/6/2022.  Patient has completed 5 PT visits.       SUBJECTIVE  Subjective: Patient returns to clinic reporting that she is not only concerned, but frustrated with the fact that she has not been able to note any consistent/lasting improvement with the PT so far. She likes the repetitive elbow extension with pt OP, as it feels good, but relief does not last. Noting she continues to experience increased pain when she is picking up/lifting her grand children. Pain experienced tends to be either near the biceps area(cubital fossa) and/or along radial aspect of distal forarm.    Current Pain level: 3/10.     Initial Pain level: 4/10.   Changes in function:  None  Adverse reaction to treatment or activity: None    OBJECTIVE  Objective: AROM of R UE(shoulder, elbow, wrist) WNL. Palpation of pronator teres and pronator quadratus muscle tenderness. R biceps strength 4+/5, +pn, R triceps strength 5/5, pain free, R wrist flexion and extension strength 5/5, supination 5/5 +/- pn, pronation 4/5 +pn. Ongoing A/P R upper quadrant muscle tightness, reduced R scapular mobility(rot and lateral tilt mobility) and biceps tightness.     ASSESSMENT/PLAN  Updated problem list and treatment plan: Diagnosis 1:  R elbow pain  Pain -  manual therapy, education, directional preference exercise and home program  Decreased ROM/flexibility - manual therapy, therapeutic exercise, therapeutic activity and home program  Decreased joint mobility - manual therapy, therapeutic exercise,  therapeutic activity and home program  Decreased strength - therapeutic exercise, therapeutic activities and home program  Decreased function - therapeutic activities and home program  STG/LTGs have been met or progress has been made towards goals:  None  Assessment of Progress: The patient's condition is unchanged.    Re: Marie Puente   :   1955    ASSESSMENT/PLAN (continued)  Self Management Plans:  Patient is independent in a home treatment program.  Patient is independent in self management of symptoms.  I have re-evaluated this patient and find that the nature, scope, duration and intensity of the therapy is appropriate for the medical condition of the patient.  Marie continues to require the following intervention to meet STG and LTG's:  PT    Recommendations:  Patient may benefit from further evaluation and  patient would benefit from continued therapy as needed.     Frequency:  1 X week, once daily  Duration:  for 3 additional weeks  This patient would benefit from further evaluation.    Thank you for your referral.    INQUIRIES      Therapist:  Awa Guthrie, PT, Cert MDT  [address]  [facility] [address]  Phone: [phone]  Fax: [phone]

## 2022-10-09 ENCOUNTER — HEALTH MAINTENANCE LETTER (OUTPATIENT)
Age: 67
End: 2022-10-09

## 2022-10-10 NOTE — PROGRESS NOTES
Subjective:  HPI  Physical Exam                    Objective:  System    Physical Exam    General     ROS    Assessment/Plan:    PROGRESS  REPORT    Progress reporting period is from 8/10/2022 to 10/6/2022.  Patient has completed 5 PT visits.       SUBJECTIVE  Subjective: Patient returns to clinic reporting that she is not only concerned, but frustrated with the fact that she has not been able to note any consistent/lasting improvement with the PT so far. She likes the repetitive elbow extension with pt OP, as it feels good, but relief does not last. Noting she continues to experience increased pain when she is picking up/lifting her grand children. Pain experienced tends to be either near the biceps area(cubital fossa) and/or along radial aspect of distal forarm.    Current Pain level: 3/10.     Initial Pain level: 4/10.   Changes in function:  None  Adverse reaction to treatment or activity: None    OBJECTIVE    Objective: AROM of R UE(shoulder, elbow, wrist) WNL. Palpation of pronator teres and pronator quadratus muscle tenderness. R biceps strength 4+/5, +pn, R triceps strength 5/5, pain free, R wrist flexion and extension strength 5/5, supination 5/5 +/- pn, pronation 4/5 +pn. Ongoing A/P R upper quadrant muscle tightness, reduced R scapular mobility(rot and lateral tilt mobility) and biceps tightness.     ASSESSMENT/PLAN  Updated problem list and treatment plan: Diagnosis 1:  R elbow pain  Pain -  manual therapy, education, directional preference exercise and home program  Decreased ROM/flexibility - manual therapy, therapeutic exercise, therapeutic activity and home program  Decreased joint mobility - manual therapy, therapeutic exercise, therapeutic activity and home program  Decreased strength - therapeutic exercise, therapeutic activities and home program  Decreased function - therapeutic activities and home program  STG/LTGs have been met or progress has been made towards goals:  None  Assessment of  Progress: The patient's condition is unchanged.  Self Management Plans:  Patient is independent in a home treatment program.  Patient is independent in self management of symptoms.  I have re-evaluated this patient and find that the nature, scope, duration and intensity of the therapy is appropriate for the medical condition of the patient.  Marie continues to require the following intervention to meet STG and LTG's:  PT    Recommendations:    Patient may benefit from further evaluation and  patient would benefit from continued therapy as needed.     Frequency:  1 X week, once daily  Duration:  for 3 additional weeks      This patient would benefit from further evaluation.    Please refer to the daily flowsheet for treatment today, total treatment time and time spent performing 1:1 timed codes.

## 2023-01-24 PROBLEM — M25.521 RIGHT ELBOW PAIN: Status: RESOLVED | Noted: 2022-08-10 | Resolved: 2023-01-24

## 2023-01-24 NOTE — PROGRESS NOTES
Patient did not return for further treatment and no additional progress was noted.  Please refer to the progress note and goal flowsheet completed on 10/06/22 for discharge information.

## 2023-04-13 ENCOUNTER — TRANSFERRED RECORDS (OUTPATIENT)
Dept: HEALTH INFORMATION MANAGEMENT | Facility: CLINIC | Age: 68
End: 2023-04-13

## 2023-05-27 ENCOUNTER — HEALTH MAINTENANCE LETTER (OUTPATIENT)
Age: 68
End: 2023-05-27

## 2023-06-10 ENCOUNTER — HEALTH MAINTENANCE LETTER (OUTPATIENT)
Age: 68
End: 2023-06-10

## 2023-12-07 ENCOUNTER — MEDICAL CORRESPONDENCE (OUTPATIENT)
Dept: HEALTH INFORMATION MANAGEMENT | Facility: CLINIC | Age: 68
End: 2023-12-07

## 2024-08-03 ENCOUNTER — HEALTH MAINTENANCE LETTER (OUTPATIENT)
Age: 69
End: 2024-08-03

## 2024-10-25 ENCOUNTER — TRANSFERRED RECORDS (OUTPATIENT)
Dept: HEALTH INFORMATION MANAGEMENT | Facility: CLINIC | Age: 69
End: 2024-10-25
Payer: COMMERCIAL

## 2024-10-31 ENCOUNTER — TRANSFERRED RECORDS (OUTPATIENT)
Dept: HEALTH INFORMATION MANAGEMENT | Facility: CLINIC | Age: 69
End: 2024-10-31
Payer: COMMERCIAL

## 2024-11-05 ENCOUNTER — TRANSFERRED RECORDS (OUTPATIENT)
Dept: HEALTH INFORMATION MANAGEMENT | Facility: CLINIC | Age: 69
End: 2024-11-05
Payer: COMMERCIAL

## 2024-11-06 ENCOUNTER — TRANSCRIBE ORDERS (OUTPATIENT)
Dept: OTHER | Age: 69
End: 2024-11-06

## 2024-11-06 DIAGNOSIS — K86.9 PANCREATIC LESION: Primary | ICD-10-CM

## 2024-11-15 ENCOUNTER — HOSPITAL ENCOUNTER (OUTPATIENT)
Dept: PET IMAGING | Facility: CLINIC | Age: 69
Setting detail: NUCLEAR MEDICINE
Discharge: HOME | End: 2024-11-15
Attending: PHYSICIAN ASSISTANT | Admitting: PHYSICIAN ASSISTANT
Payer: COMMERCIAL

## 2024-11-15 DIAGNOSIS — R91.1 PULMONARY NODULE: ICD-10-CM

## 2024-11-15 DIAGNOSIS — K86.9 LESION OF PANCREAS: ICD-10-CM

## 2024-11-15 DIAGNOSIS — I88.0 NONSPECIFIC MESENTERIC LYMPHADENITIS: ICD-10-CM

## 2024-11-15 PROCEDURE — 78815 PET IMAGE W/CT SKULL-THIGH: CPT | Mod: 26 | Performed by: RADIOLOGY

## 2024-11-15 PROCEDURE — 250N000011 HC RX IP 250 OP 636

## 2024-11-15 PROCEDURE — A9592 HC RX IP 250 OP 636: HCPCS

## 2024-11-15 PROCEDURE — 78815 PET IMAGE W/CT SKULL-THIGH: CPT | Mod: PS

## 2024-11-15 RX ADMIN — COPPER CU 64 DOTATATE 4.4 MILLICURIE: 1 INJECTION, SOLUTION INTRAVENOUS at 14:31

## 2024-11-18 ENCOUNTER — TRANSFERRED RECORDS (OUTPATIENT)
Dept: HEALTH INFORMATION MANAGEMENT | Facility: CLINIC | Age: 69
End: 2024-11-18
Payer: COMMERCIAL

## 2024-11-18 ENCOUNTER — TRANSCRIBE ORDERS (OUTPATIENT)
Dept: OTHER | Age: 69
End: 2024-11-18

## 2024-11-18 DIAGNOSIS — K86.9 PANCREATIC LESION: Primary | ICD-10-CM

## 2024-11-19 ENCOUNTER — PATIENT OUTREACH (OUTPATIENT)
Dept: SURGERY | Facility: CLINIC | Age: 69
End: 2024-11-19
Payer: COMMERCIAL

## 2024-11-19 NOTE — PROGRESS NOTES
New Patient Oncology Nurse Navigator Note     Referring provider: Dr. Tiera Kwong     Referring Clinic/Organization: Other - [facility]      Referred to: Hepatobiliary Surgery      Requested provider (if applicable): Dr. Marlon Ward     Referral Received: 11/19/24       Evaluation for :  Pancreas Lesion - EUS pending at time of referral     Clinical History (per Nurse review of records provided):      See book marked documents:     Referring MD office note  Imaging reports     Allergies   Allergen Reactions    Dust Mites Difficulty breathing    Sulfa Antibiotics Hives and Swelling        Patient Active Problem List   Diagnosis    Impingement syndrome of shoulder region, right         Clinical Assessment / Barriers to Care (Per Nurse):    None at this time.     Records Location:     [facility] Everywhere   Faxed - Media tab/Scanned     Records Needed:     ABDOMINAL IMAGING (CT SCANS, MRI, US, PET SCANS)  DATING BACK TO 2018      Additional testing needed prior to consult:     NONE AT THIS TIME    Referral updates and Plan:       Consult with Surgical Oncology     11/19/2024 12:17 PM - called and spoke with patient regarding referral to meet with surgical oncology. Informed her I did see she was already referred to GI as well which is important for her to proceed with as well as they will be the team to complete the biopsy. Informed her ideally this would be prior to our consult and she was agreeable to this, informed her we can get her on the schedule for a few weeks from now so that biopsy can be completed and finalized prior to visit, informed her we can adjust appointment as needed pending date of biopsy.     Melissa Hernandez, RN, BSN   Surgical Oncology New Patient Nurse Navigator  [facility]  [phone]

## 2024-11-20 ENCOUNTER — PREP FOR PROCEDURE (OUTPATIENT)
Dept: GASTROENTEROLOGY | Facility: CLINIC | Age: 69
End: 2024-11-20
Payer: COMMERCIAL

## 2024-11-20 ENCOUNTER — PATIENT OUTREACH (OUTPATIENT)
Dept: GASTROENTEROLOGY | Facility: CLINIC | Age: 69
End: 2024-11-20
Payer: COMMERCIAL

## 2024-11-20 DIAGNOSIS — K86.89 PANCREATIC MASS: Primary | ICD-10-CM

## 2024-11-20 NOTE — TELEPHONE ENCOUNTER
"Called to discuss referral and Dr Morales's recommendations with patient.     \"I presume that the referral to me is regarding the incidental enhancing pancreatic mass and not the palpable large pelvic mass that led to the imaging in the first place?     Procedure/Imaging/Clinic: Upper EUS   Physician: Andrew   Timing: Within 2-3 weeks   Scope time needed:90 minute [facility] slot   Anesthesia:MAC   Dx: Enhancing pancreatic tail mass   Tier:Tier 2 - Not life/limb threatening but potential for  patient morbidity/mortality or adverse  patient/disease outcome if  surgery/procedure not done within 30 day   Location: [facility]   OK to schedule while attending: No.   Header of letter for pt communication:    Examination using ultrasound from inside the gastrointestinal tract.\"     Referral     Tiera Kwong NP     Pt in agreement with 12/5 OR procedure date, next available. Pt has consultations arranged with surg onc on 12/9 and [facility], she was told she may need a hysterectomy.     Explained OR will call 1-2 days prior to procedure date with arrival time, will need a , someone to stay with them for 24 hours and should stay in town for 24 hours (within 45 min of Hospital) post procedure    Patient needs to get pre-op physical completed. If outside  health system will need physical faxed to number [phone]     If you do not get a preop physical, your procedure could be cancelled, patient voiced understanding*    Preop Plan: [facility] Tiera Kwong NP, pt will arrange within 30 days of procedure    Does patient have any history of gastric bypass/gastric surgery/altered panc/bili anatomy? none    Any recent Covid symptoms or positive covid test? none    Does patient have [employer] insurance?:[facility]    Med Review    Blood thinner -  none  ASA - none  Diabetic - none  Any meds by injection or mouth for weight loss or diabetes-none    Patient Education r/t procedure: emmett MAHARAJ pre-op nurse " will call 1-2 days prior to the procedure.    NPO/Prep:   Adults and Children of all ages may consume solids up to 8 hours prior to arrival time - may consume clear liquids up to 1 hour prior to arrival time.    Verbalized understanding of all instructions. All questions answered.     Procedure order placed, message routed to OR / Endo

## 2024-12-03 RX ORDER — MULTIPLE VITAMINS W/ MINERALS TAB 9MG-400MCG
1 TAB ORAL DAILY
COMMUNITY

## 2024-12-03 RX ORDER — AMOXICILLIN 500 MG
1200 CAPSULE ORAL DAILY
COMMUNITY

## 2024-12-03 RX ORDER — FLUTICASONE PROPIONATE 50 MCG
1 SPRAY, SUSPENSION (ML) NASAL DAILY
COMMUNITY

## 2024-12-03 RX ORDER — MULTIVIT WITH MINERALS/LUTEIN
1000 TABLET ORAL DAILY
COMMUNITY

## 2024-12-03 RX ORDER — SPIRONOLACTONE 25 MG
20 TABLET ORAL DAILY
COMMUNITY

## 2024-12-03 RX ORDER — ALBUTEROL SULFATE 90 UG/1
2 INHALANT RESPIRATORY (INHALATION) EVERY 6 HOURS PRN
COMMUNITY

## 2024-12-03 RX ORDER — ROSUVASTATIN CALCIUM 40 MG/1
40 TABLET, COATED ORAL DAILY
COMMUNITY

## 2024-12-04 ENCOUNTER — ANESTHESIA EVENT (OUTPATIENT)
Dept: SURGERY | Facility: CLINIC | Age: 69
End: 2024-12-04
Payer: COMMERCIAL

## 2024-12-05 ENCOUNTER — HOSPITAL ENCOUNTER (OUTPATIENT)
Facility: CLINIC | Age: 69
Discharge: HOME | End: 2024-12-05
Attending: INTERNAL MEDICINE | Admitting: INTERNAL MEDICINE
Payer: COMMERCIAL

## 2024-12-05 ENCOUNTER — ANESTHESIA (OUTPATIENT)
Dept: SURGERY | Facility: CLINIC | Age: 69
End: 2024-12-05
Payer: COMMERCIAL

## 2024-12-05 VITALS
RESPIRATION RATE: 16 BRPM | HEART RATE: 73 BPM | SYSTOLIC BLOOD PRESSURE: 131 MMHG | WEIGHT: 170.64 LBS | HEIGHT: 67 IN | DIASTOLIC BLOOD PRESSURE: 75 MMHG | BODY MASS INDEX: 26.78 KG/M2 | TEMPERATURE: 97.6 F | OXYGEN SATURATION: 95 %

## 2024-12-05 PROCEDURE — 710N000012 HC RECOVERY PHASE 2, PER MINUTE: Performed by: INTERNAL MEDICINE

## 2024-12-05 PROCEDURE — 88305 TISSUE EXAM BY PATHOLOGIST: CPT | Mod: 26 | Performed by: PATHOLOGY

## 2024-12-05 PROCEDURE — 360N000075 HC SURGERY LEVEL 2, PER MIN: Performed by: INTERNAL MEDICINE

## 2024-12-05 PROCEDURE — 370N000017 HC ANESTHESIA TECHNICAL FEE, PER MIN: Performed by: INTERNAL MEDICINE

## 2024-12-05 PROCEDURE — 272N000001 HC OR GENERAL SUPPLY STERILE: Performed by: INTERNAL MEDICINE

## 2024-12-05 PROCEDURE — 88342 IMHCHEM/IMCYTCHM 1ST ANTB: CPT | Mod: TC | Performed by: INTERNAL MEDICINE

## 2024-12-05 PROCEDURE — 250N000011 HC RX IP 250 OP 636: Performed by: ANESTHESIOLOGY

## 2024-12-05 PROCEDURE — 88172 CYTP DX EVAL FNA 1ST EA SITE: CPT | Mod: TC | Performed by: INTERNAL MEDICINE

## 2024-12-05 PROCEDURE — 88172 CYTP DX EVAL FNA 1ST EA SITE: CPT | Mod: 26 | Performed by: PATHOLOGY

## 2024-12-05 PROCEDURE — 88173 CYTOPATH EVAL FNA REPORT: CPT | Mod: 26 | Performed by: PATHOLOGY

## 2024-12-05 PROCEDURE — 250N000009 HC RX 250: Performed by: ANESTHESIOLOGY

## 2024-12-05 PROCEDURE — 88360 TUMOR IMMUNOHISTOCHEM/MANUAL: CPT | Mod: 26 | Performed by: PATHOLOGY

## 2024-12-05 PROCEDURE — 258N000003 HC RX IP 258 OP 636: Performed by: ANESTHESIOLOGY

## 2024-12-05 PROCEDURE — 999N000141 HC STATISTIC PRE-PROCEDURE NURSING ASSESSMENT: Performed by: INTERNAL MEDICINE

## 2024-12-05 PROCEDURE — 88342 IMHCHEM/IMCYTCHM 1ST ANTB: CPT | Mod: 26 | Performed by: PATHOLOGY

## 2024-12-05 PROCEDURE — 88341 IMHCHEM/IMCYTCHM EA ADD ANTB: CPT | Mod: 26 | Performed by: PATHOLOGY

## 2024-12-05 RX ORDER — SODIUM CHLORIDE, SODIUM LACTATE, POTASSIUM CHLORIDE, CALCIUM CHLORIDE 600; 310; 30; 20 MG/100ML; MG/100ML; MG/100ML; MG/100ML
INJECTION, SOLUTION INTRAVENOUS CONTINUOUS PRN
Status: DISCONTINUED | OUTPATIENT
Start: 2024-12-05 | End: 2024-12-05

## 2024-12-05 RX ORDER — OXYCODONE HYDROCHLORIDE 10 MG/1
10 TABLET ORAL
Status: DISCONTINUED | OUTPATIENT
Start: 2024-12-05 | End: 2024-12-05 | Stop reason: HOSPADM

## 2024-12-05 RX ORDER — PROCHLORPERAZINE MALEATE 5 MG/1
5 TABLET ORAL EVERY 6 HOURS PRN
Status: DISCONTINUED | OUTPATIENT
Start: 2024-12-05 | End: 2024-12-05 | Stop reason: HOSPADM

## 2024-12-05 RX ORDER — ONDANSETRON 4 MG/1
4 TABLET, ORALLY DISINTEGRATING ORAL EVERY 30 MIN PRN
Status: DISCONTINUED | OUTPATIENT
Start: 2024-12-05 | End: 2024-12-05 | Stop reason: HOSPADM

## 2024-12-05 RX ORDER — ONDANSETRON 2 MG/ML
INJECTION INTRAMUSCULAR; INTRAVENOUS PRN
Status: DISCONTINUED | OUTPATIENT
Start: 2024-12-05 | End: 2024-12-05

## 2024-12-05 RX ORDER — LIDOCAINE 40 MG/G
CREAM TOPICAL
Status: DISCONTINUED | OUTPATIENT
Start: 2024-12-05 | End: 2024-12-05 | Stop reason: HOSPADM

## 2024-12-05 RX ORDER — PROPOFOL 10 MG/ML
INJECTION, EMULSION INTRAVENOUS PRN
Status: DISCONTINUED | OUTPATIENT
Start: 2024-12-05 | End: 2024-12-05

## 2024-12-05 RX ORDER — ONDANSETRON 2 MG/ML
4 INJECTION INTRAMUSCULAR; INTRAVENOUS EVERY 30 MIN PRN
Status: DISCONTINUED | OUTPATIENT
Start: 2024-12-05 | End: 2024-12-05 | Stop reason: HOSPADM

## 2024-12-05 RX ORDER — DEXAMETHASONE SODIUM PHOSPHATE 4 MG/ML
4 INJECTION, SOLUTION INTRA-ARTICULAR; INTRALESIONAL; INTRAMUSCULAR; INTRAVENOUS; SOFT TISSUE
Status: DISCONTINUED | OUTPATIENT
Start: 2024-12-05 | End: 2024-12-05 | Stop reason: HOSPADM

## 2024-12-05 RX ORDER — PROPOFOL 10 MG/ML
INJECTION, EMULSION INTRAVENOUS CONTINUOUS PRN
Status: DISCONTINUED | OUTPATIENT
Start: 2024-12-05 | End: 2024-12-05

## 2024-12-05 RX ORDER — OXYCODONE HYDROCHLORIDE 5 MG/1
5 TABLET ORAL
Status: DISCONTINUED | OUTPATIENT
Start: 2024-12-05 | End: 2024-12-05 | Stop reason: HOSPADM

## 2024-12-05 RX ORDER — LIDOCAINE HYDROCHLORIDE 20 MG/ML
INJECTION, SOLUTION INFILTRATION; PERINEURAL PRN
Status: DISCONTINUED | OUTPATIENT
Start: 2024-12-05 | End: 2024-12-05

## 2024-12-05 RX ORDER — NALOXONE HYDROCHLORIDE 0.4 MG/ML
0.4 INJECTION, SOLUTION INTRAMUSCULAR; INTRAVENOUS; SUBCUTANEOUS
Status: DISCONTINUED | OUTPATIENT
Start: 2024-12-05 | End: 2024-12-05 | Stop reason: HOSPADM

## 2024-12-05 RX ORDER — ONDANSETRON 4 MG/1
4 TABLET, ORALLY DISINTEGRATING ORAL EVERY 6 HOURS PRN
Status: DISCONTINUED | OUTPATIENT
Start: 2024-12-05 | End: 2024-12-05 | Stop reason: HOSPADM

## 2024-12-05 RX ORDER — ONDANSETRON 2 MG/ML
4 INJECTION INTRAMUSCULAR; INTRAVENOUS EVERY 6 HOURS PRN
Status: DISCONTINUED | OUTPATIENT
Start: 2024-12-05 | End: 2024-12-05 | Stop reason: HOSPADM

## 2024-12-05 RX ORDER — NALOXONE HYDROCHLORIDE 0.4 MG/ML
0.2 INJECTION, SOLUTION INTRAMUSCULAR; INTRAVENOUS; SUBCUTANEOUS
Status: DISCONTINUED | OUTPATIENT
Start: 2024-12-05 | End: 2024-12-05 | Stop reason: HOSPADM

## 2024-12-05 RX ORDER — FLUMAZENIL 0.1 MG/ML
0.2 INJECTION, SOLUTION INTRAVENOUS
Status: DISCONTINUED | OUTPATIENT
Start: 2024-12-05 | End: 2024-12-05 | Stop reason: HOSPADM

## 2024-12-05 RX ORDER — NALOXONE HYDROCHLORIDE 0.4 MG/ML
0.1 INJECTION, SOLUTION INTRAMUSCULAR; INTRAVENOUS; SUBCUTANEOUS
Status: DISCONTINUED | OUTPATIENT
Start: 2024-12-05 | End: 2024-12-05 | Stop reason: HOSPADM

## 2024-12-05 RX ADMIN — SODIUM CHLORIDE, POTASSIUM CHLORIDE, SODIUM LACTATE AND CALCIUM CHLORIDE: 600; 310; 30; 20 INJECTION, SOLUTION INTRAVENOUS at 11:13

## 2024-12-05 RX ADMIN — PROPOFOL 20 MG: 10 INJECTION, EMULSION INTRAVENOUS at 11:20

## 2024-12-05 RX ADMIN — PROPOFOL 30 MG: 10 INJECTION, EMULSION INTRAVENOUS at 11:28

## 2024-12-05 RX ADMIN — ONDANSETRON 4 MG: 2 INJECTION INTRAMUSCULAR; INTRAVENOUS at 11:58

## 2024-12-05 RX ADMIN — PROPOFOL 100 MCG/KG/MIN: 10 INJECTION, EMULSION INTRAVENOUS at 11:21

## 2024-12-05 RX ADMIN — LIDOCAINE HYDROCHLORIDE 100 MG: 20 INJECTION, SOLUTION INFILTRATION; PERINEURAL at 11:15

## 2024-12-05 RX ADMIN — ONDANSETRON 4 MG: 4 TABLET, ORALLY DISINTEGRATING ORAL at 12:27

## 2024-12-05 RX ADMIN — TOPICAL ANESTHETIC 1 SPRAY: 200 SPRAY DENTAL; PERIODONTAL at 11:15

## 2024-12-05 ASSESSMENT — ACTIVITIES OF DAILY LIVING (ADL)
ADLS_ACUITY_SCORE: 38

## 2024-12-05 NOTE — ANESTHESIA CARE TRANSFER NOTE
Patient: Marie Puente    Procedure: Procedure(s):  ENDOSCOPIC ULTRASOUND, ESOPHAGOSCOPY / UPPER GASTROINTESTINAL TRACT (GI) with needle biopsy       Diagnosis: Pancreatic mass [K86.89]  Diagnosis Additional Information: No value filed.    Anesthesia Type:   MAC     Note:      Level of Consciousness: awake  Oxygen Supplementation: room air    Independent Airway: airway patency satisfactory and stable  Dentition: dentition unchanged  Vital Signs Stable: post-procedure vital signs reviewed and stable  Report to RN Given: handoff report given  Patient transferred to: Phase II    Handoff Report: Identifed the Patient, Identified the Reponsible Provider, Reviewed the pertinent medical history, Discussed the surgical course, Reviewed Intra-OP anesthesia mangement and issues during anesthesia, Set expectations for post-procedure period and Allowed opportunity for questions and acknowledgement of understanding      Vitals:  Vitals Value Taken Time   /74 12/05/24 1215   Temp 97.6    Pulse 70 12/05/24 1211   Resp 16 12/05/24 1210   SpO2 98 % 12/05/24 1217   Vitals shown include unfiled device data.    Electronically Signed By: RONNY Whitley CRNA  December 5, 2024  12:17 PM

## 2024-12-05 NOTE — ANESTHESIA PREPROCEDURE EVALUATION
"Anesthesia Pre-Procedure Evaluation    Patient: Marie Puente   MRN: [id number] : 1955        Procedure : Procedure(s):  ENDOSCOPIC ULTRASOUND, ESOPHAGOSCOPY / UPPER GASTROINTESTINAL TRACT (GI)          69F hx asthma, hyperlipidemia, and hearing loss who has an incidentally-found pancreatic mass that is to be biopsied endoscopically.    History reviewed. No pertinent past medical history.   History reviewed. No pertinent surgical history.   Allergies   Allergen Reactions    Dust Mites Difficulty breathing     Dust and Mold    Sulfa Antibiotics Hives and Swelling      Social History     Tobacco Use    Smoking status: Never    Smokeless tobacco: Never   Substance Use Topics    Alcohol use: Yes     Comment: Rare      Wt Readings from Last 1 Encounters:   No data found for Wt        Anesthesia Evaluation            ROS/MED HX  ENT/Pulmonary:     (+)                      asthma                  Neurologic:       Cardiovascular:     (+) Dyslipidemia - -   -  - -                                      METS/Exercise Tolerance:     Hematologic:       Musculoskeletal:       GI/Hepatic:       Renal/Genitourinary:       Endo:       Psychiatric/Substance Use:       Infectious Disease:       Malignancy:       Other:            Physical Exam    Airway        Mallampati: III   TM distance: > 3 FB   Neck ROM: full   Mouth opening: > 3 cm    Respiratory Devices and Support         Dental       (+) Modest Abnormalities - crowns, retainers, 1 or 2 missing teeth      Cardiovascular   cardiovascular exam normal          Pulmonary   pulmonary exam normal                OUTSIDE LABS:  CBC: No results found for: \"WBC\", \"HGB\", \"HCT\", \"PLT\"  BMP: No results found for: \"NA\", \"POTASSIUM\", \"CHLORIDE\", \"CO2\", \"BUN\", \"CR\", \"GLC\"  COAGS: No results found for: \"PTT\", \"INR\", \"FIBR\"  POC: No results found for: \"BGM\", \"HCG\", \"HCGS\"  HEPATIC: No results found for: \"ALBUMIN\", \"PROTTOTAL\", \"ALT\", \"AST\", \"GGT\", \"ALKPHOS\", \"BILITOTAL\", " "\"BILIDIRECT\", \"KEVIN\"  OTHER: No results found for: \"PH\", \"LACT\", \"A1C\", \"RAUDEL\", \"PHOS\", \"MAG\", \"LIPASE\", \"AMYLASE\", \"TSH\", \"T4\", \"T3\", \"CRP\", \"SED\"    Anesthesia Plan    ASA Status:  2    NPO Status:  NPO Appropriate    Anesthesia Type: MAC.     - Reason for MAC: chronic cardiopulmonary disease, immobility needed, straight local not clinically adequate   Induction: Propofol.   Maintenance: TIVA.        Consents    Anesthesia Plan(s) and associated risks, benefits, and realistic alternatives discussed. Questions answered and patient/representative(s) expressed understanding.     - Discussed: Risks, Benefits and Alternatives for BOTH SEDATION and the PROCEDURE were discussed     - Discussed with:  Patient      - Extended Intubation/Ventilatory Support Discussed: No.      - Patient is DNR/DNI Status: No     Use of blood products discussed: No .     Postoperative Care    Pain management: IV analgesics.   PONV prophylaxis: Background Propofol Infusion     Comments:               Bigg Weston MD    I have reviewed the pertinent notes and labs in the chart from the past 30 days and (re)examined the patient.  Any updates or changes from those notes are reflected in this note.                                   "

## 2024-12-05 NOTE — TELEPHONE ENCOUNTER
RECORDS STATUS - ALL OTHER DIAGNOSIS      RECORDS RECEIVED FROM: [facility]   DATE RECEIVED: 12/5   NOTES STATUS DETAILS   OFFICE NOTE from referring provider [facility] 11/18/24   OFFICE NOTE from medical oncologist     DISCHARGE SUMMARY from hospital     DISCHARGE REPORT from the ER     OPERATIVE REPORT [facility] 12/5/24: EUS   MEDICATION LIST     CLINICAL TRIAL TREATMENTS TO DATE     LABS     PATHOLOGY REPORTS     ANYTHING RELATED TO DIAGNOSIS [facility] 11/25/24   PATHOLOGY FEDEX TRACKING   TRACKING #:   GENONOMIC TESTING     TYPE:     IMAGING (NEED IMAGES & REPORT)     CT SCANS PACS 10/31/24: [facility]   MRI     XRAYS     ULTRASOUND     PET PACS 11/15/24: [facility]   IMAGE DISC FEDEX TRACKING   TRACKING #:

## 2024-12-05 NOTE — BRIEF OP NOTE
[facility]    Brief Operative Note    Pre-operative diagnosis: Pancreatic mass [K86.89]  Post-operative diagnosis Same    Procedure: ENDOSCOPIC ULTRASOUND, ESOPHAGOSCOPY / UPPER GASTROINTESTINAL TRACT (GI) with needle biopsy, N/A - Esophagus    Surgeon: Surgeons and Role:     * Daryl Morales MD - Primary  Anesthesia: MAC   Estimated Blood Loss: Minimal    Drains: None  Specimens:   ID Type Source Tests Collected by Time Destination   1 : Pancreatic tail mass Fine Needle Aspiration Pancreas FINE NEEDLE ASPIRATE Daryl Morales MD 12/5/2024 11:44 AM      Findings:     Endoscopic examination was normal, including retroflex in the stomach.    The pancreatic tail mass was visualized and [id number] x 10 mm in diameter. There were a few tiny internal cysts without Doppler signal. The periphery had several small vessels seen only on Doppler. The margins were smooth. This was on the inferior aspect adjacent to the left kidney. There was no identifiable involvement of the duct, however the main duct could not be visualized.  Needle aspiration was performed x 4 passes with a 22 ga EchoTip needle. Preliminary cytology showed adequate cellularity.    EUS exam was otherwise normal. No adenopathy, liver or left adrenal lesions were seen.  .  Complications: None.  Implants: * No implants in log *      KASEY Morales MD  Professor of Medicine  Division of Gastroenterology, Hepatology and Nutrition  [facility]

## 2024-12-05 NOTE — ANESTHESIA POSTPROCEDURE EVALUATION
Patient: Marie Puente    Procedure: Procedure(s):  ENDOSCOPIC ULTRASOUND, ESOPHAGOSCOPY / UPPER GASTROINTESTINAL TRACT (GI) with needle biopsy       Anesthesia Type:  MAC    Note:  Disposition: Outpatient   Postop Pain Control: Uneventful            Sign Out: Well controlled pain   PONV: No   Neuro/Psych: Uneventful            Sign Out: Acceptable/Baseline neuro status   Airway/Respiratory: Uneventful            Sign Out: Acceptable/Baseline resp. status   CV/Hemodynamics: Uneventful            Sign Out: Acceptable CV status; No obvious hypovolemia; No obvious fluid overload   Other NRE: NONE   DID A NON-ROUTINE EVENT OCCUR? No           Last vitals:  Vitals Value Taken Time   BP     Temp     Pulse 70 12/05/24 1211   Resp     SpO2 96 % 12/05/24 1211   Vitals shown include unfiled device data.    Electronically Signed By: Bigg Weston MD  December 5, 2024  12:12 PM

## 2024-12-05 NOTE — DISCHARGE INSTRUCTIONS
Contacting your Doctor -   To contact a doctor, call Dr Morales at the  GI clinic at [phone] Monday - Friday 8am-5pm or:  [phone] and ask for the resident on call for Gastroenterology (answered 24 hours a day)   Emergency Department:  [place]: [phone]  [facility]: [phone] 911 if you are in need of immediate or emergent help

## 2024-12-05 NOTE — ADDENDUM NOTE
Addendum  created 12/05/24 1218 by Nabil Fournier APRN CRNA    Clinical Note Signed, Flowsheet accepted, Intraprocedure Meds edited

## 2024-12-05 NOTE — ANESTHESIA CARE TRANSFER NOTE
Patient: Marie Puente    Procedure: Procedure(s):  ENDOSCOPIC ULTRASOUND, ESOPHAGOSCOPY / UPPER GASTROINTESTINAL TRACT (GI) with needle biopsy       Diagnosis: Pancreatic mass [K86.89]  Diagnosis Additional Information: No value filed.    Anesthesia Type:   MAC     Note:    Oropharynx: oropharynx clear of all foreign objects  Level of Consciousness: awake  Oxygen Supplementation: nasal cannula    Independent Airway: airway patency satisfactory and stable  Dentition: dentition unchanged  Vital Signs Stable: post-procedure vital signs reviewed and stable  Report to RN Given: handoff report given  Patient transferred to: Phase II    Handoff Report: Identifed the Patient, Identified the Reponsible Provider, Reviewed the pertinent medical history, Discussed the surgical course, Reviewed Intra-OP anesthesia mangement and issues during anesthesia, Set expectations for post-procedure period and Allowed opportunity for questions and acknowledgement of understanding      Vitals:  Vitals Value Taken Time   BP     Temp     Pulse 70 12/05/24 1211   Resp 16 12/05/24 1210   SpO2 99 % 12/05/24 1212   Vitals shown include unfiled device data.    Electronically Signed By: Bigg Weston MD  December 5, 2024  12:13 PM

## 2024-12-09 ENCOUNTER — ONCOLOGY VISIT (OUTPATIENT)
Dept: SURGERY | Facility: CLINIC | Age: 69
End: 2024-12-09
Attending: NURSE ANESTHETIST, CERTIFIED REGISTERED
Payer: COMMERCIAL

## 2024-12-09 ENCOUNTER — PRE VISIT (OUTPATIENT)
Dept: SURGERY | Facility: CLINIC | Age: 69
End: 2024-12-09
Payer: COMMERCIAL

## 2024-12-09 ENCOUNTER — TELEPHONE (OUTPATIENT)
Dept: GASTROENTEROLOGY | Facility: CLINIC | Age: 69
End: 2024-12-09

## 2024-12-09 VITALS
WEIGHT: 171.5 LBS | DIASTOLIC BLOOD PRESSURE: 69 MMHG | OXYGEN SATURATION: 98 % | HEART RATE: 88 BPM | BODY MASS INDEX: 27.56 KG/M2 | SYSTOLIC BLOOD PRESSURE: 111 MMHG | TEMPERATURE: 97.7 F | HEIGHT: 66 IN

## 2024-12-09 DIAGNOSIS — D3A.8 NEUROENDOCRINE TUMOR OF PANCREAS (H): Primary | ICD-10-CM

## 2024-12-09 LAB
PATH REPORT.COMMENTS IMP SPEC: ABNORMAL
PATH REPORT.COMMENTS IMP SPEC: YES
PATH REPORT.FINAL DX SPEC: ABNORMAL
PATH REPORT.GROSS SPEC: ABNORMAL
PATH REPORT.MICROSCOPIC SPEC OTHER STN: ABNORMAL
PATH REPORT.RELEVANT HX SPEC: ABNORMAL

## 2024-12-09 PROCEDURE — 99204 OFFICE O/P NEW MOD 45 MIN: CPT | Performed by: SURGERY

## 2024-12-09 PROCEDURE — G0463 HOSPITAL OUTPT CLINIC VISIT: HCPCS | Performed by: SURGERY

## 2024-12-09 RX ORDER — FLUTICASONE PROPIONATE 50 MCG
SPRAY, SUSPENSION (ML) NASAL
COMMUNITY

## 2024-12-09 ASSESSMENT — PAIN SCALES - GENERAL: PAINLEVEL_OUTOF10: NO PAIN (0)

## 2024-12-09 NOTE — NURSING NOTE
"Oncology Rooming Note    December 9, 2024 8:57 AM   Marie Puente is a 69 year old female who presents for:    Chief Complaint   Patient presents with    Oncology Clinic Visit     New Eval for Pancreatic Lesion     Initial Vitals: /69 (BP Location: Right arm, Patient Position: Right side, Cuff Size: Adult Regular)   Pulse 88   Temp 97.7  F (36.5  C) (Oral)   Ht 1.676 m (5' 6\")   Wt 77.8 kg (171 lb 8 oz)   SpO2 98%   BMI 27.68 kg/m   Estimated body mass index is 27.68 kg/m  as calculated from the following:    Height as of this encounter: 1.676 m (5' 6\").    Weight as of this encounter: 77.8 kg (171 lb 8 oz). Body surface area is 1.9 meters squared.  No Pain (0) Comment: Data Unavailable   No LMP recorded. Patient is postmenopausal.  Allergies reviewed: Yes  Medications reviewed: Yes    Medications: Medication refills not needed today.  Pharmacy name entered into [facility]: [facility] - [address]    Frailty Screening:   Is the patient here for a new oncology consult visit in cancer care? 2. No      Clinical concerns: none       Asha Rodriguez MA             "

## 2024-12-09 NOTE — LETTER
12/9/2024      Marie Puente  [address]  [place] [place] [address]      Dear Colleague,    Thank you for referring your patient, Marie Puente, to the [facility]. Please see a copy of my visit note below.    Ms. Puente is a 69-year-old female who was recently being evaluated for a uterine fibroid which is fairly large and causing her symptoms.  She had abdominal imaging which revealed an arterial enhancing lesion in the tail of her pancreas measuring 1.2 cm.  This was consistent with a neuroendocrine tumor and therefore a dotatate scan was performed confirming the presence of an area of increased uptake in the tail of the pancreas measuring 1.2 cm.  An endoscopic ultrasound was also performed.  Biopsies were taken and now confirmed a well-differentiated, grade 1, pancreatic neuroendocrine tumor.  I was asked to see the patient to discuss next steps.    I had a long discussion with the patient and her  today regarding the findings of her biopsy.  Given the small size of her tumor which is less than 2 cm, I recommended against surgical intervention.  I discussed that these tumors are generally benign in nature although they are a type of cancer.  In many cases patients can live a normal life expectancy even without any treatment.  Given its small size, and well-differentiated characteristics, I recommended surveillance with gastroenterology.  Certainly if this lesion were to grow rapidly I would be happy to see her again in the future for discussion regarding possible laparoscopic distal pancreatectomy.    I did go over the [facility] teaching sheet for distal pancreatectomy and discussed the details of neuroendocrine tumor with the patient just so she would have that information in the event that she needs surgery in the future although I think it is unlikely.    We will make sure that she is set up with gastroenterology for ongoing surveillance.  I  would be happy to see her again in the event that her pancreatic neuroendocrine tumor grows rapidly or changes in such a way that surgery should be considered.  She was very happy with that plan.    30 minutes were spent in face-to-face time.  15 minutes were spent in review of history, imaging, coordination of care.  5 minutes were spent documentation.    The above was transcribed using Dragon voice recognition software that is now required for use by [facility] and [facility] in place of transcription of dictated notes.  This change may unfortunately lead into an increase in errors in the EMR. While I reviewed and edited the transcription, I may miss errors.  Please let me know of any of serious errors and I will addend the note.       Again, thank you for allowing me to participate in the care of your patient.        Sincerely,        Marlon Montilla MD

## 2024-12-09 NOTE — PROGRESS NOTES
Ms. Puente is a 69-year-old female who was recently being evaluated for a uterine fibroid which is fairly large and causing her symptoms.  She had abdominal imaging which revealed an arterial enhancing lesion in the tail of her pancreas measuring 1.2 cm.  This was consistent with a neuroendocrine tumor and therefore a dotatate scan was performed confirming the presence of an area of increased uptake in the tail of the pancreas measuring 1.2 cm.  An endoscopic ultrasound was also performed.  Biopsies were taken and now confirmed a well-differentiated, grade 1, pancreatic neuroendocrine tumor.  I was asked to see the patient to discuss next steps.    I had a long discussion with the patient and her  today regarding the findings of her biopsy.  Given the small size of her tumor which is less than 2 cm, I recommended against surgical intervention.  I discussed that these tumors are generally benign in nature although they are a type of cancer.  In many cases patients can live a normal life expectancy even without any treatment.  Given its small size, and well-differentiated characteristics, I recommended surveillance with gastroenterology.  Certainly if this lesion were to grow rapidly I would be happy to see her again in the future for discussion regarding possible laparoscopic distal pancreatectomy.    I did go over the [facility] teaching sheet for distal pancreatectomy and discussed the details of neuroendocrine tumor with the patient just so she would have that information in the event that she needs surgery in the future although I think it is unlikely.    We will make sure that she is set up with gastroenterology for ongoing surveillance.  I would be happy to see her again in the event that her pancreatic neuroendocrine tumor grows rapidly or changes in such a way that surgery should be considered.  She was very happy with that plan.    30 minutes were spent in face-to-face time.  15 minutes  were spent in review of history, imaging, coordination of care.  5 minutes were spent documentation.    The above was transcribed using Dragon voice recognition software that is now required for use by [facility] and [facility] in place of transcription of dictated notes.  This change may unfortunately lead into an increase in errors in the EMR. While I reviewed and edited the transcription, I may miss errors.  Please let me know of any of serious errors and I will addend the note.

## 2024-12-09 NOTE — TELEPHONE ENCOUNTER
Naye:    Final cytology from pancreas lesion showed well-differentiated PNET.    She has already met with Dr. Montilla who is deferring surgical resection.    Please arrange for referral to medical oncology to discuss further evaluation, management and surveillance.    No need for further GI follow-up.    Called and discussed with ramana Morales MD  Professor of Medicine  Division of Gastroenterology, Hepatology and Nutrition  [facility]

## 2024-12-11 ENCOUNTER — PATIENT OUTREACH (OUTPATIENT)
Dept: ONCOLOGY | Facility: CLINIC | Age: 69
End: 2024-12-11
Payer: COMMERCIAL

## 2024-12-11 NOTE — PROGRESS NOTES
Oncology referral received from Dr Montilla, Surg Onc, for patient with pancreatic neuroendocrine tumor.    (See my bookmarks for pertinent records in Epic)    SCHEDULE Onc next available for pancreatic neuroendocrine tumor -Pa 12/11      HX 10/31/2024 CT AP w/panc mass manolo for NET, 11/15 dPET confirms, 12/5 EUS panc bx at [facility] + NET, 12/9 EJ does not rec surgery due to small size, rec Med Onc for surveillance. Will proceed to offer next available Med Onc per pt preference.    Rasheed Monroy RN  Oncology Nurse Navigator  [facility]  [phone]

## 2024-12-12 LAB — UPPER EUS: NORMAL

## 2024-12-17 ENCOUNTER — DOCUMENTATION ONLY (OUTPATIENT)
Dept: OTHER | Facility: CLINIC | Age: 69
End: 2024-12-17
Payer: COMMERCIAL

## 2024-12-21 ENCOUNTER — HEALTH MAINTENANCE LETTER (OUTPATIENT)
Age: 69
End: 2024-12-21

## 2025-01-02 ENCOUNTER — TRANSFERRED RECORDS (OUTPATIENT)
Dept: HEALTH INFORMATION MANAGEMENT | Facility: CLINIC | Age: 70
End: 2025-01-02
Payer: COMMERCIAL

## 2025-01-02 NOTE — PROGRESS NOTES
GYNECOLOGIC ONCOLOGY CONSULT    Patient Name: ERIKA CAIN Patient : 1955  Patient MRN: [id number]  Referring Physician: Tiera Kwong PA-C  Primary GYNOncologist: Nora Chang (Gynecological/Oncology)  Date of Service: 12/10/2024    Reason for Consult:  Uterine mass  Neuroendocrine tumor (NET) of pancreatic tail  Urinary urgency  History of Present Illness (Gyn Oncology):  70 yo  postmenopausal female who presented for an annual wellness visit on 10/25/2024 with Tiera Kwong PA-C. Her main concern was for some sinus congestion, possible sinus infection. She also had mentioned some abdominal fullness. An abdominal mass was palpated. CT abdomen and pelvis was ordered and this noted an incidental 1 x 1.2 cm lesion in the pancreatic tail and a large subserosal fibroid that measured 12.2 x 9.2 x 11.5 cm that extended to the ventral abdomen and pelvis. There was no bladder wall thickening. There was also a 4 mm nodule in the right lower lobe. She underwent a Dotatate PET/CT, which noted the pancreatic tail nodule with intense radiotracer uptake likely representing a neuroendocrine tumor with no definite evidence to suggest metastatic disease. There was some mild avidity within the inferior portion of the previously noted uterine mass. She underwent an endoscopic ultrasound biopsy of the pancreatic mass on 2024. She established care on 2024 with Dr. Marlon Montilla for surgical evaluation at [facility]. Given that the NET of pancreas was less than 2 cm, he recommended against surgical intervention. He counseled Erika & her  that these NETs are usually very slow growing and reviewed the surgery if this were ever required in the future, but felt like she would be able to have surveillance with GI.    She then presented to GYN for GYN oncology consultation on 12/10/2024 given the uterine mass. She denies any known history of previous uterine fibroids. She did have an episode  of diverticulitis. This was coincided with her last menstrual period and recalls heavy bleeding, but it was vaginal in nature and not rectal in nature. She required a hospitalization for diverticular abscess with concern for some perforation. They managed non-surgically, ~. She has a history of difficulty getting pregnant and adopted their first child and then was able to become pregnant with three pregnancies after the adoption. She had a diagnostic laparoscopy in her early 30s and there were reportedly no abnormalities seen. She is here today with her  for further recommendations in regards to this uterine mass.    Genetic Testing  N/A    Review of Systems:  A complete 14-point review of systems is negative except as noted in the above history of present illness.    Past Medical History:  Uterine fibroid  Surgical History:  Endoscopic ultrasound biopsy on 2024  Mohs surgery x 3    OB/Gyn History:  -0-0-3   x 3.  Had some infertility issues that led to laparoscopy.   First child was adopted. Total of 4 children  Age of menarche: 10  Age at first live birth: 31  LMP: age 54 years old  History of some irregular periods with some heavier bleeding.    Allergies#  NKA  Medications:       Family History:  Melanoma: sister diagnosed at age 42,  of a melanoma, recurrence at age 61.    Social History:  She is  and lives with her .  She is retired.  She has a college degree.  She attended [employer] and was a double major in both business in [place].  Used to work as a  for [employer].  Never smoker.  Reports very rare alcohol use.    Health Maintenance:  Last colonoscopy: in , should be due around now or .  Last mammogram: 2023, had diagnostic mammogram in May 2024 due to mastitis.  Last pap smear: stopped 2/2 age; denies history of abnormal pap smears    Vital Signs:  Blood pressure: 110/68, Pulse: 88, Temperature: 97.4 F,  Respirations: 16, O2 sat: 95%, Pain Scale: 0, Height: 66 in, Weight: 170.6 lb, BSA: 1.87, BMI: 27.54 kg/m2    Physical Exam (Gyn Oncology):  General: alert, cooperative, no acute distress  HEENT: normocephalic, trachea midline, no thyromegaly  Lungs: no labored breathing on room air.  Cardiac: regular rate and rhythm  Abdomen: Soft, mild distention, nontender with palpable mass in lower quadrant.  Extremities: no edema  Neurologic Exam: no focal deficits  Psych: normal mood and affect  Pelvic: deferred to OR    Laboratory Data:  Biopsy of pancreas and pancreatic tail mass on 2024:  Final diagnosis:  Positive for neoplasm  - Well differentiated neuroendocrine tumor            Imaging:  CT abdomen and pelvis on 11/15/2024:  Impression:  No acute process is Identified in the abdomen or pelvis.  Sigmoid diverticulosis without evidence of diverticulitis.  A large subserosal fibroid extending into the ventral abdomen/pelvis.  A suspected 1.2 cm hypervascular lesion in the pancreatic tall which is suspicious for possible neuroendocrine tumor in the appropriate clinical setting.   Nonspecific haziness in the right lower quadrant mesentery. No obvious bowel abnormality in the region.   A 4 mm nodule In the right lower lobe.     PET Dotatate Eyes to Thighs on 11/15/2024:  IMPRESSION:  1. Pancreatic tail soft tissue nodule with intense radiotracer uptake (Krenning score 4), likely representing a neuroendocrine tumor. Note that tissue sampling of this lesion may be accessible via endoscopic guidance through the stomach or proximal small bowel.  2. No definite evidence to suggest metastatic disease.  3. 4 mm pulmonary nodules, PET indeterminate due to size.       Mammography on 05/10/2024:  Impression:  1. ACR BI-RADS Category 1-negative.    Problems:  Pelvic mass  Neuroendocrine tumor of pancreas  Uterine fibroid    Assessment & Plan (Gyn Oncology):  69-year-old  postmenopausal female noted to have a uterine mass  in addition to NET of pancreas. Here today for consultation in regards to this uterine mass.    1. Uterine mass  We reviewed that findings are most likely consistent with an uterine fibroid, however, sarcomas can arise within uterine fibroids and would require definitive tissue diagnosis. It is unclear how long this mass has been present, but it is concerning that there is onset of new symptoms such as urinary urgency.  We discussed that fibroids should not grow in a postmenopausal phase.  We discussed that fibroids can be common with about 50% of women. However, once women have gone through menopause and hormonal effect is taken away, these should essentially shrink in size. Any potential for growth within a fibroid can be suspicious for a possible malignant process.  We discussed that there are many benefits to surgery with symptom relief, definitive tissue diagnosis, ability to perform minimally invasive surgery, as the size is already a little bit on the cusp for minimally invasive surgery (MIS) vs open surgery.  We discussed the benefits of MIS surgery with decreased blood loss, less postoperative pain, and overall better recovery.  We discussed there are still situations where we might convert to laparotomy if more evidence of cancer outside the uterus is noted and discordant with imaging.  We discussed the robotic technique in detail.  We reviewed the anatomy of the pelvis.  We discussed alternatives, benefits, and risks to proceeding with the surgery.  Alternatives would include watchful waiting with interval imaging; however, this could allow time for cancer to grow or symptoms to worsen.  We discussed benefits of surgery include: definitive tissue diagnosis, symptom alleviation as well as critical time window given the size of this area of being an MIS candidate vs need an open approach, if continues to increase in size.  We discussed risks, including, but are not limited to: infection, bleeding, injury to  "surrounding structures, failure to cure, medical complications such as blood clots or pneumonias, potential for overly aggressive surgery.  We reviewed intraoperative frozen section analysis.  We discussed possible cancer staging.  Patient and  had good questions.  She has been through a lot of recent medical workup procedures and overload of information.  We discussed they certainly can go home and review and change their minds at any point in time.  They wish to proceed with preoperative education and met with GynOnc RN, Elizabeth PORTER.  They did meet with surgical scheduler, Roberta ONEIL, and she she did feel comfortable with scheduling, but did understand that she could change her mind or call back after they have had more time to process the information.  They understandably were prioritizing the pancreatic mass and were relieved to hear about the overall good prognosis associated with that but she understands why surgery would be recommended in the uterine mass situation.  All questions answered to her and her  satisfaction at today's visit.     2. ABIMAEL pancreatic tail  See HPI for full workup.   She is status post surgical consultation on 12/09/2024 with Dr. Montilla. She will not require surgery given the size is less than 2 cm and will undergo GI surveillance.  Recent dotatate PET/CT on 11/15/2024 without evidence for metastatic disease.  3. Medical comorbidities:  Personal history of uterine fibroid + #2    4. Preoperative considerations  Diagnosis Code: R19; D39; C25.9  Surgery: \"robotic-assisted total laparoscopic hysterectomy, bilateral salpingo-oophorectomy, pelvic washings, possible mini-laparotomy, possible cancer staging\"  Estimated Total Time: 150 minutes  Anesthesia: GENERAL  Alert Pathology for Frozen Section: YES  Bowel Prep: NO  Joint Case: NO  Patient Status: Same-Day Discharge  Preoperative Clearance Visit: YES  Labs on Day of Surgery: T&S & Hgb  Surgical Antibiotic Prophylaxis: IV " Cefazolin 2 grams + IV Metronidazole 500 milligrams  VTE Prophylaxis: SQ Heparin 5,000 units + bilateral SCDs  Additional Considerations: diagnosed with NET of pancreatic tail & saw Dr. Montilla at [facility] on 12/9/2024 - no role for surgery at this time as <2 cm.    Treatment Plan and Consent  Current treatment plan of definitive hysterectomy for uterine mass was reviewed in detail. See counseling above. Specifically, the counseling included: goals of the treatment, prognosis, frequency, intended benefits, associated risks/harms and side effects and medically reasonable alternatives.    I spent a total of 60 minutes of cumulative time in care of this patient, which included >50% of time spent in direct patient care which included patient interview, examination, and treatment counseling. The remainder of the time was spent in medical documentation, review of records and care coordination.    I provided the patient an opportunity to ask questions and I answered all of their treatment related questions to the best of my ability. The patient expressed understanding and consent to this plan of care.    Pain Care Management:  Pain Scale: 0      The patient and family/friends if present were apprised of the use of [employer] remote documentation service and all parties consented to conducting the visit in this manner.    Documentation assistance provided by mynor Jiang for Nora Chang MD on 12/10/2024.    I, Nora Chang MD,  personally performed the services described in this documentation, and it is both accurate and complete.      Nora Chang MD  Phone: [phone]  Fax: [phone]

## 2025-01-06 ENCOUNTER — HOSPITAL ENCOUNTER (OUTPATIENT)
Facility: CLINIC | Age: 70
Discharge: HOME | End: 2025-01-06
Attending: OBSTETRICS & GYNECOLOGY | Admitting: OBSTETRICS & GYNECOLOGY
Payer: COMMERCIAL

## 2025-01-06 ENCOUNTER — ANESTHESIA EVENT (OUTPATIENT)
Dept: SURGERY | Facility: CLINIC | Age: 70
End: 2025-01-06
Payer: COMMERCIAL

## 2025-01-06 ENCOUNTER — ANESTHESIA (OUTPATIENT)
Dept: SURGERY | Facility: CLINIC | Age: 70
End: 2025-01-06
Payer: COMMERCIAL

## 2025-01-06 VITALS
RESPIRATION RATE: 14 BRPM | BODY MASS INDEX: 27.02 KG/M2 | HEART RATE: 62 BPM | WEIGHT: 168.1 LBS | TEMPERATURE: 96.7 F | DIASTOLIC BLOOD PRESSURE: 56 MMHG | HEIGHT: 66 IN | OXYGEN SATURATION: 94 % | SYSTOLIC BLOOD PRESSURE: 101 MMHG

## 2025-01-06 DIAGNOSIS — R19.00 PELVIC MASS: Primary | ICD-10-CM

## 2025-01-06 LAB
ABO + RH BLD: NORMAL
BLD GP AB SCN SERPL QL: NEGATIVE
ERYTHROCYTE [DISTWIDTH] IN BLOOD BY AUTOMATED COUNT: 14.1 % (ref 10–15)
HCT VFR BLD AUTO: 46.2 % (ref 35–47)
HGB BLD-MCNC: 15.2 G/DL (ref 11.7–15.7)
MCH RBC QN AUTO: 30.2 PG (ref 26.5–33)
MCHC RBC AUTO-ENTMCNC: 32.9 G/DL (ref 31.5–36.5)
MCV RBC AUTO: 92 FL (ref 78–100)
PATH REPORT.COMMENTS IMP SPEC: NORMAL
PATH REPORT.INTRAOP OBS SPEC DOC: NORMAL
PLATELET # BLD AUTO: 343 10E3/UL (ref 150–450)
RBC # BLD AUTO: 5.04 10E6/UL (ref 3.8–5.2)
SPECIMEN EXP DATE BLD: NORMAL
WBC # BLD AUTO: 9.1 10E3/UL (ref 4–11)

## 2025-01-06 PROCEDURE — 258N000003 HC RX IP 258 OP 636: Performed by: NURSE ANESTHETIST, CERTIFIED REGISTERED

## 2025-01-06 PROCEDURE — 86850 RBC ANTIBODY SCREEN: CPT | Performed by: PHYSICIAN ASSISTANT

## 2025-01-06 PROCEDURE — 85027 COMPLETE CBC AUTOMATED: CPT | Performed by: PHYSICIAN ASSISTANT

## 2025-01-06 PROCEDURE — 36415 COLL VENOUS BLD VENIPUNCTURE: CPT | Performed by: PHYSICIAN ASSISTANT

## 2025-01-06 PROCEDURE — 250N000025 HC SEVOFLURANE, PER MIN: Performed by: OBSTETRICS & GYNECOLOGY

## 2025-01-06 PROCEDURE — 88112 CYTOPATH CELL ENHANCE TECH: CPT | Mod: TC | Performed by: OBSTETRICS & GYNECOLOGY

## 2025-01-06 PROCEDURE — 250N000009 HC RX 250: Performed by: NURSE ANESTHETIST, CERTIFIED REGISTERED

## 2025-01-06 PROCEDURE — 250N000011 HC RX IP 250 OP 636: Performed by: NURSE ANESTHETIST, CERTIFIED REGISTERED

## 2025-01-06 PROCEDURE — 370N000017 HC ANESTHESIA TECHNICAL FEE, PER MIN: Performed by: OBSTETRICS & GYNECOLOGY

## 2025-01-06 PROCEDURE — 88305 TISSUE EXAM BY PATHOLOGIST: CPT | Mod: TC | Performed by: OBSTETRICS & GYNECOLOGY

## 2025-01-06 PROCEDURE — 250N000011 HC RX IP 250 OP 636: Mod: JZ | Performed by: OBSTETRICS & GYNECOLOGY

## 2025-01-06 PROCEDURE — 250N000011 HC RX IP 250 OP 636: Performed by: ANESTHESIOLOGY

## 2025-01-06 PROCEDURE — 250N000011 HC RX IP 250 OP 636: Performed by: PHYSICIAN ASSISTANT

## 2025-01-06 PROCEDURE — 710N000012 HC RECOVERY PHASE 2, PER MINUTE: Performed by: OBSTETRICS & GYNECOLOGY

## 2025-01-06 PROCEDURE — 86900 BLOOD TYPING SEROLOGIC ABO: CPT | Performed by: PHYSICIAN ASSISTANT

## 2025-01-06 PROCEDURE — 710N000009 HC RECOVERY PHASE 1, LEVEL 1, PER MIN: Performed by: OBSTETRICS & GYNECOLOGY

## 2025-01-06 PROCEDURE — 360N000080 HC SURGERY LEVEL 7, PER MIN: Performed by: OBSTETRICS & GYNECOLOGY

## 2025-01-06 PROCEDURE — 88331 PATH CONSLTJ SURG 1 BLK 1SPC: CPT | Mod: TC | Performed by: OBSTETRICS & GYNECOLOGY

## 2025-01-06 PROCEDURE — 999N000141 HC STATISTIC PRE-PROCEDURE NURSING ASSESSMENT: Performed by: OBSTETRICS & GYNECOLOGY

## 2025-01-06 PROCEDURE — 258N000001 HC RX 258: Performed by: OBSTETRICS & GYNECOLOGY

## 2025-01-06 PROCEDURE — 250N000009 HC RX 250: Performed by: OBSTETRICS & GYNECOLOGY

## 2025-01-06 PROCEDURE — 272N000002 HC OR SUPPLY OTHER OPNP: Performed by: OBSTETRICS & GYNECOLOGY

## 2025-01-06 PROCEDURE — 250N000013 HC RX MED GY IP 250 OP 250 PS 637: Performed by: PHYSICIAN ASSISTANT

## 2025-01-06 PROCEDURE — 272N000001 HC OR GENERAL SUPPLY STERILE: Performed by: OBSTETRICS & GYNECOLOGY

## 2025-01-06 RX ORDER — ONDANSETRON 2 MG/ML
4 INJECTION INTRAMUSCULAR; INTRAVENOUS EVERY 30 MIN PRN
Status: DISCONTINUED | OUTPATIENT
Start: 2025-01-06 | End: 2025-01-06 | Stop reason: HOSPADM

## 2025-01-06 RX ORDER — FENTANYL CITRATE 50 UG/ML
50 INJECTION, SOLUTION INTRAMUSCULAR; INTRAVENOUS EVERY 5 MIN PRN
Status: DISCONTINUED | OUTPATIENT
Start: 2025-01-06 | End: 2025-01-06 | Stop reason: HOSPADM

## 2025-01-06 RX ORDER — HALOPERIDOL 5 MG/ML
1 INJECTION INTRAMUSCULAR
Status: DISCONTINUED | OUTPATIENT
Start: 2025-01-06 | End: 2025-01-06 | Stop reason: HOSPADM

## 2025-01-06 RX ORDER — DEXAMETHASONE SODIUM PHOSPHATE 4 MG/ML
4 INJECTION, SOLUTION INTRA-ARTICULAR; INTRALESIONAL; INTRAMUSCULAR; INTRAVENOUS; SOFT TISSUE
Status: DISCONTINUED | OUTPATIENT
Start: 2025-01-06 | End: 2025-01-06 | Stop reason: HOSPADM

## 2025-01-06 RX ORDER — OXYCODONE HYDROCHLORIDE 5 MG/1
5 TABLET ORAL
Status: COMPLETED | OUTPATIENT
Start: 2025-01-06 | End: 2025-01-06

## 2025-01-06 RX ORDER — PROPOFOL 10 MG/ML
INJECTION, EMULSION INTRAVENOUS PRN
Status: DISCONTINUED | OUTPATIENT
Start: 2025-01-06 | End: 2025-01-06

## 2025-01-06 RX ORDER — FENTANYL CITRATE 50 UG/ML
INJECTION, SOLUTION INTRAMUSCULAR; INTRAVENOUS PRN
Status: DISCONTINUED | OUTPATIENT
Start: 2025-01-06 | End: 2025-01-06

## 2025-01-06 RX ORDER — MAGNESIUM HYDROXIDE 1200 MG/15ML
LIQUID ORAL PRN
Status: DISCONTINUED | OUTPATIENT
Start: 2025-01-06 | End: 2025-01-06 | Stop reason: HOSPADM

## 2025-01-06 RX ORDER — ONDANSETRON 2 MG/ML
INJECTION INTRAMUSCULAR; INTRAVENOUS PRN
Status: DISCONTINUED | OUTPATIENT
Start: 2025-01-06 | End: 2025-01-06

## 2025-01-06 RX ORDER — HEPARIN SODIUM 5000 [USP'U]/.5ML
5000 INJECTION, SOLUTION INTRAVENOUS; SUBCUTANEOUS
Status: COMPLETED | OUTPATIENT
Start: 2025-01-06 | End: 2025-01-06

## 2025-01-06 RX ORDER — SODIUM CHLORIDE, SODIUM LACTATE, POTASSIUM CHLORIDE, CALCIUM CHLORIDE 600; 310; 30; 20 MG/100ML; MG/100ML; MG/100ML; MG/100ML
INJECTION, SOLUTION INTRAVENOUS CONTINUOUS PRN
Status: DISCONTINUED | OUTPATIENT
Start: 2025-01-06 | End: 2025-01-06

## 2025-01-06 RX ORDER — PROPOFOL 10 MG/ML
INJECTION, EMULSION INTRAVENOUS CONTINUOUS PRN
Status: DISCONTINUED | OUTPATIENT
Start: 2025-01-06 | End: 2025-01-06

## 2025-01-06 RX ORDER — ACETAMINOPHEN 325 MG/1
975 TABLET ORAL ONCE
Status: DISCONTINUED | OUTPATIENT
Start: 2025-01-07 | End: 2025-01-06 | Stop reason: HOSPADM

## 2025-01-06 RX ORDER — HYDROMORPHONE HCL IN WATER/PF 6 MG/30 ML
0.2 PATIENT CONTROLLED ANALGESIA SYRINGE INTRAVENOUS EVERY 5 MIN PRN
Status: DISCONTINUED | OUTPATIENT
Start: 2025-01-06 | End: 2025-01-06 | Stop reason: HOSPADM

## 2025-01-06 RX ORDER — CEFAZOLIN SODIUM/WATER 2 G/20 ML
2 SYRINGE (ML) INTRAVENOUS SEE ADMIN INSTRUCTIONS
Status: DISCONTINUED | OUTPATIENT
Start: 2025-01-06 | End: 2025-01-06 | Stop reason: HOSPADM

## 2025-01-06 RX ORDER — ONDANSETRON 4 MG/1
4 TABLET, ORALLY DISINTEGRATING ORAL EVERY 30 MIN PRN
Status: DISCONTINUED | OUTPATIENT
Start: 2025-01-06 | End: 2025-01-06 | Stop reason: HOSPADM

## 2025-01-06 RX ORDER — FENTANYL CITRATE 50 UG/ML
25 INJECTION, SOLUTION INTRAMUSCULAR; INTRAVENOUS EVERY 5 MIN PRN
Status: DISCONTINUED | OUTPATIENT
Start: 2025-01-06 | End: 2025-01-06 | Stop reason: HOSPADM

## 2025-01-06 RX ORDER — HYDROMORPHONE HCL IN WATER/PF 6 MG/30 ML
0.4 PATIENT CONTROLLED ANALGESIA SYRINGE INTRAVENOUS EVERY 5 MIN PRN
Status: DISCONTINUED | OUTPATIENT
Start: 2025-01-06 | End: 2025-01-06 | Stop reason: HOSPADM

## 2025-01-06 RX ORDER — METRONIDAZOLE 500 MG/100ML
500 INJECTION, SOLUTION INTRAVENOUS
Status: COMPLETED | OUTPATIENT
Start: 2025-01-06 | End: 2025-01-06

## 2025-01-06 RX ORDER — AMOXICILLIN 250 MG
1-2 CAPSULE ORAL 2 TIMES DAILY PRN
Qty: 30 TABLET | Refills: 0 | Status: SHIPPED | OUTPATIENT
Start: 2025-01-06

## 2025-01-06 RX ORDER — CEFAZOLIN SODIUM/WATER 2 G/20 ML
2 SYRINGE (ML) INTRAVENOUS
Status: COMPLETED | OUTPATIENT
Start: 2025-01-06 | End: 2025-01-06

## 2025-01-06 RX ORDER — KETOROLAC TROMETHAMINE 30 MG/ML
INJECTION, SOLUTION INTRAMUSCULAR; INTRAVENOUS PRN
Status: DISCONTINUED | OUTPATIENT
Start: 2025-01-06 | End: 2025-01-06

## 2025-01-06 RX ORDER — NALOXONE HYDROCHLORIDE 0.4 MG/ML
0.1 INJECTION, SOLUTION INTRAMUSCULAR; INTRAVENOUS; SUBCUTANEOUS
Status: DISCONTINUED | OUTPATIENT
Start: 2025-01-06 | End: 2025-01-06 | Stop reason: HOSPADM

## 2025-01-06 RX ORDER — LABETALOL 20 MG/4 ML (5 MG/ML) INTRAVENOUS SYRINGE
PRN
Status: DISCONTINUED | OUTPATIENT
Start: 2025-01-06 | End: 2025-01-06

## 2025-01-06 RX ORDER — BUPIVACAINE HYDROCHLORIDE 5 MG/ML
INJECTION, SOLUTION EPIDURAL; INTRACAUDAL PRN
Status: DISCONTINUED | OUTPATIENT
Start: 2025-01-06 | End: 2025-01-06 | Stop reason: HOSPADM

## 2025-01-06 RX ORDER — OXYCODONE HYDROCHLORIDE 5 MG/1
5-10 TABLET ORAL EVERY 4 HOURS PRN
Qty: 10 TABLET | Refills: 0 | Status: SHIPPED | OUTPATIENT
Start: 2025-01-06

## 2025-01-06 RX ORDER — DEXAMETHASONE SODIUM PHOSPHATE 4 MG/ML
INJECTION, SOLUTION INTRA-ARTICULAR; INTRALESIONAL; INTRAMUSCULAR; INTRAVENOUS; SOFT TISSUE PRN
Status: DISCONTINUED | OUTPATIENT
Start: 2025-01-06 | End: 2025-01-06

## 2025-01-06 RX ORDER — IBUPROFEN 600 MG/1
600 TABLET, FILM COATED ORAL ONCE
Status: DISCONTINUED | OUTPATIENT
Start: 2025-01-07 | End: 2025-01-06 | Stop reason: HOSPADM

## 2025-01-06 RX ORDER — LIDOCAINE HYDROCHLORIDE 20 MG/ML
INJECTION, SOLUTION INFILTRATION; PERINEURAL PRN
Status: DISCONTINUED | OUTPATIENT
Start: 2025-01-06 | End: 2025-01-06

## 2025-01-06 RX ADMIN — KETOROLAC TROMETHAMINE 15 MG: 30 INJECTION, SOLUTION INTRAMUSCULAR at 17:44

## 2025-01-06 RX ADMIN — PROPOFOL 200 MG: 10 INJECTION, EMULSION INTRAVENOUS at 14:41

## 2025-01-06 RX ADMIN — FENTANYL CITRATE 25 MCG: 50 INJECTION, SOLUTION INTRAMUSCULAR; INTRAVENOUS at 18:34

## 2025-01-06 RX ADMIN — Medication 2 G: at 14:47

## 2025-01-06 RX ADMIN — DEXMEDETOMIDINE HYDROCHLORIDE 12 MCG: 100 INJECTION, SOLUTION INTRAVENOUS at 15:39

## 2025-01-06 RX ADMIN — ROCURONIUM BROMIDE 20 MG: 50 INJECTION, SOLUTION INTRAVENOUS at 15:13

## 2025-01-06 RX ADMIN — ONDANSETRON 4 MG: 2 INJECTION INTRAMUSCULAR; INTRAVENOUS at 17:42

## 2025-01-06 RX ADMIN — LIDOCAINE HYDROCHLORIDE 80 MG: 20 INJECTION, SOLUTION INFILTRATION; PERINEURAL at 14:41

## 2025-01-06 RX ADMIN — METRONIDAZOLE 500 MG: 500 INJECTION, SOLUTION INTRAVENOUS at 12:26

## 2025-01-06 RX ADMIN — ROCURONIUM BROMIDE 10 MG: 50 INJECTION, SOLUTION INTRAVENOUS at 17:20

## 2025-01-06 RX ADMIN — SODIUM CHLORIDE, POTASSIUM CHLORIDE, SODIUM LACTATE AND CALCIUM CHLORIDE: 600; 310; 30; 20 INJECTION, SOLUTION INTRAVENOUS at 17:37

## 2025-01-06 RX ADMIN — ONDANSETRON 4 MG: 2 INJECTION INTRAMUSCULAR; INTRAVENOUS at 21:26

## 2025-01-06 RX ADMIN — OXYCODONE HYDROCHLORIDE 5 MG: 5 TABLET ORAL at 19:20

## 2025-01-06 RX ADMIN — DEXMEDETOMIDINE HYDROCHLORIDE 8 MCG: 100 INJECTION, SOLUTION INTRAVENOUS at 16:20

## 2025-01-06 RX ADMIN — Medication 200 MG: at 17:48

## 2025-01-06 RX ADMIN — ROCURONIUM BROMIDE 50 MG: 50 INJECTION, SOLUTION INTRAVENOUS at 14:41

## 2025-01-06 RX ADMIN — PROPOFOL 30 MCG/KG/MIN: 10 INJECTION, EMULSION INTRAVENOUS at 15:18

## 2025-01-06 RX ADMIN — HYDROMORPHONE HYDROCHLORIDE 0.5 MG: 1 INJECTION, SOLUTION INTRAMUSCULAR; INTRAVENOUS; SUBCUTANEOUS at 14:59

## 2025-01-06 RX ADMIN — PHENYLEPHRINE HYDROCHLORIDE 100 MCG: 10 INJECTION INTRAVENOUS at 15:10

## 2025-01-06 RX ADMIN — PHENYLEPHRINE HYDROCHLORIDE 0.2 MCG/KG/MIN: 10 INJECTION INTRAVENOUS at 15:05

## 2025-01-06 RX ADMIN — HEPARIN SODIUM 5000 UNITS: 5000 INJECTION, SOLUTION INTRAVENOUS; SUBCUTANEOUS at 13:03

## 2025-01-06 RX ADMIN — ROCURONIUM BROMIDE 20 MG: 50 INJECTION, SOLUTION INTRAVENOUS at 15:45

## 2025-01-06 RX ADMIN — LABETALOL 20 MG/4 ML (5 MG/ML) INTRAVENOUS SYRINGE 5 MG: at 15:42

## 2025-01-06 RX ADMIN — DEXAMETHASONE SODIUM PHOSPHATE 4 MG: 4 INJECTION, SOLUTION INTRA-ARTICULAR; INTRALESIONAL; INTRAMUSCULAR; INTRAVENOUS; SOFT TISSUE at 14:50

## 2025-01-06 RX ADMIN — FENTANYL CITRATE 100 MCG: 50 INJECTION INTRAMUSCULAR; INTRAVENOUS at 14:41

## 2025-01-06 RX ADMIN — SODIUM CHLORIDE, POTASSIUM CHLORIDE, SODIUM LACTATE AND CALCIUM CHLORIDE: 600; 310; 30; 20 INJECTION, SOLUTION INTRAVENOUS at 14:41

## 2025-01-06 RX ADMIN — HYDROMORPHONE HYDROCHLORIDE 0.5 MG: 1 INJECTION, SOLUTION INTRAMUSCULAR; INTRAVENOUS; SUBCUTANEOUS at 17:04

## 2025-01-06 RX ADMIN — ROCURONIUM BROMIDE 10 MG: 50 INJECTION, SOLUTION INTRAVENOUS at 16:21

## 2025-01-06 ASSESSMENT — ACTIVITIES OF DAILY LIVING (ADL)
ADLS_ACUITY_SCORE: 41

## 2025-01-06 ASSESSMENT — ENCOUNTER SYMPTOMS
SEIZURES: 0
DYSRHYTHMIAS: 0

## 2025-01-06 NOTE — ANESTHESIA PREPROCEDURE EVALUATION
Anesthesia Pre-Procedure Evaluation    Patient: Marie Puente   MRN: [id number] : 1955        Procedure : Procedure(s):  robotic assisted total laparoscopic hysterectomy, bilateral salpingo oophorectomy,pelvic washings  possible mini laparotomy, possible cancer staging          Past Medical History:   Diagnosis Date    Asthma     Bilateral sensorineural hearing loss     Diverticulosis     Enlarged uterus     History of colonic polyps     Hyperglycemia     Hyperlipidemia     Pancreatic head neuroendocrine tumor grade I     Uterine fibroid       Past Surgical History:   Procedure Laterality Date    BASAL CELL SKIN CA ON BACK SQUAMOUS CELL SKIN CA,-MOHS      X3    ENDOSCOPIC ULTRASOUND UPPER GASTROINTESTINAL TRACT (GI) N/A 2024    Procedure: ENDOSCOPIC ULTRASOUND, ESOPHAGOSCOPY / UPPER GASTROINTESTINAL TRACT (GI) with needle biopsy;  Surgeon: Daryl Morales MD;  Location: [facility] OR    EXPLORATORY LAP PELVIS        Allergies   Allergen Reactions    Mold Difficulty breathing and Shortness Of Breath    Dust Mites Difficulty breathing    Sulfa Antibiotics Hives and Swelling      Social History     Tobacco Use    Smoking status: Never    Smokeless tobacco: Never   Substance Use Topics    Alcohol use: Yes     Comment: Rare      Wt Readings from Last 1 Encounters:   25 76.2 kg (168 lb 1.6 oz)        Anesthesia Evaluation   Pt has had prior anesthetic.         ROS/MED HX  ENT/Pulmonary:     (+)                      asthma  Treatment: Inhaler prn,       recent URI (Started augmentin, prednisone and breo 5 days ago for bronchitis. No current symptoms. Feeling at baseline.), resolved,         Neurologic:    (-) no seizures, no CVA and no TIA   Cardiovascular:     (+) Dyslipidemia - -   -  - -                                   (-) arrhythmias   METS/Exercise Tolerance: >4 METS    Hematologic:    (-) history of blood clots   Musculoskeletal:       GI/Hepatic:    (-) GERD and liver disease  "  Renal/Genitourinary:    (-) renal disease   Endo:    (-) Type II DM and obesity   Psychiatric/Substance Use:       Infectious Disease:    (-) Recent Fever   Malignancy:   (+) Malignancy (Pancreatic head neuroendocrine tumor),     Other:            Physical Exam    Airway        Mallampati: II   TM distance: > 3 FB   Neck ROM: full   Mouth opening: > 3 cm    Respiratory Devices and Support         Dental       (+) Minor Abnormalities - some fillings, tiny chips      Cardiovascular          Rhythm and rate: regular and normal     Pulmonary           breath sounds clear to auscultation           OUTSIDE LABS:  CBC:   Lab Results   Component Value Date    WBC 9.1 01/06/2025    HGB 15.2 01/06/2025    HCT 46.2 01/06/2025     01/06/2025       Anesthesia Plan    ASA Status:  3    NPO Status:  NPO Appropriate    Anesthesia Type: General.     - Airway: ETT   Induction: Intravenous, Propofol.   Maintenance: Balanced.   Techniques and Equipment:     - Lines/Monitors: 2nd IV     Consents    Anesthesia Plan(s) and associated risks, benefits, and realistic alternatives discussed. Questions answered and patient/representative(s) expressed understanding.     - Discussed: Risks, Benefits and Alternatives for the PROCEDURE were discussed     - Discussed with:  Patient            Postoperative Care    Pain management: IV analgesics, Oral pain medications, Multi-modal analgesia.   PONV prophylaxis: Ondansetron (or other 5HT-3), Dexamethasone or Solumedrol, Background Propofol Infusion     Comments:               Sandee Mireles MD    I have reviewed the pertinent notes and labs in the chart from the past 30 days and (re)examined the patient.  Any updates or changes from those notes are reflected in this note.                         # Overweight: Estimated body mass index is 27.13 kg/m  as calculated from the following:    Height as of this encounter: 1.676 m (5' 6\").    Weight as of this encounter: 76.2 kg (168 lb 1.6 oz).   "

## 2025-01-06 NOTE — ANESTHESIA PROCEDURE NOTES
Airway       Patient location during procedure: OR       Procedure Start/Stop Times: 1/6/2025 2:45 PM  Staff -        Anesthesiologist:  Mitch Reyes MD       CRNA: Gretta Blount APRN CRNA       Performed By: CRNA  Consent for Airway        Urgency: elective  Indications and Patient Condition       Indications for airway management: salvatore-procedural       Induction type:intravenous       Mask difficulty assessment: 1 - vent by mask    Final Airway Details       Final airway type: endotracheal airway       Successful airway: ETT - single  Endotracheal Airway Details        ETT size (mm): 7.0       Cuffed: yes       Cuff volume (mL): 10       Successful intubation technique: direct laryngoscopy       DL Blade Type: MAC 3       Grade View of Cords: 1       Adjucts: stylet       Position: Right       Measured from: gums/teeth       Secured at (cm): 21       Bite block used: None    Post intubation assessment        Placement verified by: capnometry, equal breath sounds and chest rise        Number of attempts at approach: 1       Number of other approaches attempted: 0       Secured with: tape       Ease of procedure: easy       Dentition: Intact and Unchanged    Medication(s) Administered   Medication Administration Time: 1/6/2025 2:45 PM

## 2025-01-07 NOTE — ANESTHESIA POSTPROCEDURE EVALUATION
Patient: Marie Puente    Procedure: Procedure(s):  Robotic-assisted total laparoscopic hysterectomy, bilateral salpingo-oophorectomy, left ureterolysis, pelvic washings, omental biopsy       Anesthesia Type:  General    Note:  Disposition: Outpatient   Postop Pain Control: Uneventful            Sign Out: Well controlled pain   PONV: No   Neuro/Psych: Uneventful            Sign Out: Acceptable/Baseline neuro status   Airway/Respiratory: Uneventful            Sign Out: Acceptable/Baseline resp. status   CV/Hemodynamics: Uneventful            Sign Out: Acceptable CV status   Other NRE: NONE   DID A NON-ROUTINE EVENT OCCUR? No           Last vitals:  Vitals Value Taken Time   BP 95/53 01/06/25 1947   Temp 35.9  C (96.7  F) 01/06/25 1813   Pulse 57 01/06/25 1948   Resp 12 01/06/25 1948   SpO2 97 % 01/06/25 1948   Vitals shown include unfiled device data.    Electronically Signed By: Tom Alfredo MD  January 6, 2025  7:49 PM

## 2025-01-07 NOTE — TELEPHONE ENCOUNTER
RECORDS STATUS - ALL OTHER DIAGNOSIS      RECORDS RECEIVED FROM: [facility] - Internal records   DATE RECEIVED: 1/7

## 2025-01-07 NOTE — OP NOTE
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     ESTIMATED BLOOD LOSS: 100 cc     IV FLUIDS: 1000 ml crystalloid    URINE OUTPUT: 500 cc clear urine     INDICATIONS: Marie Puente is a 69 year old  postmenopausal female who presented for an annual wellness visit on 10/25/2024 with Tiera Kwong PA-C. Her main concern was for some sinus congestion, possible sinus infection. She also had mentioned some abdominal fullness. An abdominal mass was palpated. CT abdomen and pelvis was ordered and this noted an incidental 1 x 1.2 cm lesion in the pancreatic tail and a large pelvic mass, favored to be subserosal fibroid, that measured 12.2 x 9.2 x 11.5 cm that extended to the ventral abdomen and pelvis. There was no bladder wall thickening. There was also a 4 mm nodule in the right lower lobe. She underwent a dotatate PET/CT, which noted the pancreatic tail nodule with intense radiotracer uptake likely representing a neuroendocrine tumor with no definite evidence to suggest metastatic disease. There was some mild avidity within the inferior portion of the previously noted uterine mass. She underwent an endoscopic ultrasound biopsy of the pancreatic mass on 2024. She established care on 2024 with Dr. Marlon Montilla for surgical evaluation at [facility]. Given that the NET of pancreas was less than 2 cm, he recommended against surgical intervention. He counseled Marie & her  that  these NETs are usually very slow growing and reviewed the surgery if this were ever required in the future, but felt like she would be able to have surveillance with GI. She then presented for GYN oncology consultation on 12/10/2024 given the large pelvic mass. She desired to proceed with definitive surgical management.    FINDINGS: On pelvic examination under anesthesia, bilateral vulva were without masses or lesions. The vaginal mucosa was mildly atrophic, without any masses or lesions. The cervix was grossly normal. The uterus sounded to 6 cm.    On laparoscopy, there was some serous-colored free fluid that was incorporated into the pelvic washings. The uterus was normal-sized. The right fallopian tube and ovary were grossly normal. There was a large, well-circumscribed mass originating from the left ovary that was ~20 x 15 cm. There was no normal ovarian tissue present. The mass was complex and ruptured with dissection. There was mucinous contents noted with rupture. The mass was adherent to left ureter and rectosigmoid colon. The bladder and posterior cul-de-sac were smooth. Bilateral ureters were noted to be vermiculating throughout the case and without evidence of hydronephrosis. There was no bulky retroperitoneal adenopathy.     The peritoneal surfaces were smooth, including bilateral hemidiaphragms. The liver, stomach and omentum were without any obvious abnormalities. The small bowel was normal-appearing on brief survey. The appendix was completely visualized and within normal limits. The colon had diverticular disease along the sigmoid colon, but, otherwise, within normal limits.    Intraoperative frozen section analysis was consistent with mucinous borderline tumor of LEFT ovary. The appendix was grossly normal. An omental biopsy was obtained. Thus, no other cancer staging procedures were indicated based upon this information.     Both the pelvis and abdomen were copiously irrigated with a total of 2  liters of normal saline, given the intraoperative rupture and spillage. The fluid contents were clear at the end of the irrigation. Irrigation was applied in steep Trendelenburg and when patient was mostly level to get to the dependent upper abdominal quadrants.     Surgicell x 2 was applied to all surgical dissection sites. There was excellent hemostasis at the conclusion of the procedure.    Repeat vaginal examination was performed and vaginal cuff was noted to be intact. There was no vaginal bleeding at the conclusion of the surgery.     SPECIMENS:   ID Type Source Tests Collected by Time Destination   1 : PELVIC WASHINGS Washings Pelvis NON-GYNECOLOGIC CYTOLOGY Nora Chang MD 1/6/2025  3:16 PM    2 : UTERUS, CERVIX, RIGHT OVARY AND RIGHT FALLOPIAN TUBE Tissue Uterus, Cervix, Right Fallopian Tube & Ovary SURGICAL PATHOLOGY EXAM Nora Chang MD 1/6/2025  4:16 PM    3 : LEFT OVARY AND LEFT FALLOPIAN TUBE Tissue Ovary and Fallopian Tube, Left SURGICAL PATHOLOGY EXAM Nora Chang MD 1/6/2025  4:44 PM    4 : OMENTAL BIOPSY Biopsy Omentum SURGICAL PATHOLOGY EXAM Nora Chang MD 1/6/2025  5:23 PM      COMPLICATIONS: None.    CONDITION: Stable to PACU.    OPERATIVE PROCEDURE IN DETAIL: Consent was reviewed with the patient in the preoperative setting and confirmed. She received prophylactic antibiotics with IV Cefazolin 2 grams and IV Metronidazole 500 milligrams. In addition, she received prophylactic SQ Heparin 5,000 units and bilateral sequential compression devices for venous thromboembolism prevention. A surgical debriefing was performed with the operating room team prior to patient entry into the operating room. The patient was transferred to the operating room and placed in dorsal supine position. General anesthetic was obtained in the usual manner without noted difficulties. The patient was then positioned onto yellowfin stirrups. The patient was prepped and draped for the  "above-mentioned procedure.    Timeout was called at which point the patient's name, procedure and operative site was confirmed by the operative team. The umbilicus was elevated and the Veress needle introduced through the base of the umbilicus. The abdomen was insufflated with an opening pressure of 3 mmHg. The Veress needle was removed. Sites for the da Shraddha XI laparoscopic ports were demarcated, total of 5, initiating' midline approximately 4 cm above the umbilicus and positioned in a straight line around the upper abdomen. The initial midline 8 mm port was inserted without any issues. The remaining 4 ports were placed under direct visualization. Left lateral ports were placed with 8 mm da Shraddha XI robotic ports x 2. Additional 8 mm da Shraddha XI robotic port was placed along medial right side x 1. The right lateral most port was 8 mm AirSeal port. CO2 insufflation was switched over to AirSeal mode. At this point, the patient was placed into steep Trendelenburg. The pelvis was inspected as well as the upper abdomen as noted above. Bowels were packed up into the upper abdomen with gentle traction.     The Medina catheter was placed under sterile technique. A speculum was placed in the vagina and instruments for the uterine manipulator were positioned. The anterior lip of the cervix was grasped. The uterus sounded to 6 cm, and cervix was serially dilated, under direct visualization using robotic camera. The medium VCare uterine manipulator was inserted and the cervical cup affixed without any difficulty. Attention was turned back to the upper abdomen. Next, the da Shraddha XI robot was docked onto the ports, and all appropriate instruments were inserted. \"Pelvic washings\" were collected and sent to surgical cytology for routine analysis.     The procedure was initiated by isolation of the right round ligament, which was cauterized and transected using synchroseal device. The posterior leaf of the broad ligament was " dissected. The right ureter was identified and noted to be vermiculating. A peritoneal window was made below the right infundibulopelvic ligament, well above the right ureter. The right IP ligament was multiply sealed and transected using the synchroseal device. The posterior peritoneum was dissected to the level of the posterior uterus with the right ureter in view at all times. The right external iliac artery and right external iliac vein were both identified. Attention was turned towards the left pelvis. Some filmy adhesions of the sigmoid colon were taken down along the line of Toldt on the left side. The left pelvic mass underwent intraoperative rupture and spillage given adherence to the rectosigmoid colon. This was taken down sharply. There was significant hypervascularity and engorgement of the left IP ligament including some collateral vessels. Given the large size of the mass it was difficult to visualize the ureter in its entirety. Thus, since the distal portion had adequate visualization, the left utero-ovarian ligament was multiply sealed and transected using the Ligasure device.    At this point, we initiated the hysterectomy by incising the vesicouterine peritoneum. The bladder flap was developed well at the upper vagina. The uterus had adequate mobility, bilateral uterine arteries could be isolated and these were cauterized with the synchroseal device and transected. Initially on the right, we extended along the cardinal and uterosacral ligaments, staying close to the cervix to the level of the upper vagina. This was cauterized and transected using the synchroseal device. Similarly, we isolated out the left cardinal ligament and uterosacral ligament which were cauterized and transected. At this point, we were able to palpate the line of the medium VCare cup at the level of the upper vagina in a circumferential manner. We incised along the upper vagina to resect the cervix and uterus. The specimen was  "brought out through the vaginal opening and labeled \"uterus, cervix, right ovary and right fallopian tube\" and sent to surgical pathology for routine analysis.    Now that there was more space created in the pelvis, the mass was able to be brought down from right upper quadrant and into the pelvis to aid in better visualization. The left ureter was dissected away from pelvic peritoneum at the level of the left pelvic brim to the tunnel of Wertheim. The mass effect created retroperitoneal fibrosis and adherence between the mass the left ureter. Once the left ureter was adequately defined and under good visualization, the left IP ligament was isolated, skeletonized, multiply sealed and transected more caudad and with 2 cm of distal left IP ligament included in the surgical specimen. There was additional rupture and spillage of the mass during dissection with extravasation of mucin contents. A 15 mm Endocatch bag was inserted transvaginally and \"left ovary and left fallopian tube\" were placed in the bag and able to be removed transvaginally. \"Left ovary and left fallopian tube\" were sent to surgical pathology for intraoperative frozen section analysis.     A wet laparotomy sponge was inserted to obtain adequate insufflation. The vaginal cuff had been well-developed and clearly the bladder was out of the way. The vaginal cuff was closed with running V-lock suture with excellent re-approximation.     Intraoperative frozen section analysis was consistent with mucinous borderline tumor of LEFT ovary. The appendix was grossly normal. An omental biopsy was obtained. Thus, no other cancer staging procedures were indicated based upon this information.     Both the pelvis and abdomen were copiously irrigated with a total of 2 liters of normal saline, given the intraoperative rupture and spillage. The fluid contents were clear at the end of the irrigation. Irrigation was applied in steep Trendelenburg and when patient was mostly " level to get to the dependent upper abdominal quadrants. Surgicell x 2 was placed along the pelvic dissection beds. There was excellent hemostasis at the conclusion of the procedure.    All laparoscopic instruments and ports were now removed and CO2 allowed to escape from the abdomen. The da Shraddha XI robot was undocked without incident. Local analgesia was applied to all port sites. Skin was reapproximated with 4-0 Monocryl sutures and sterile skin glue applied.    Marcy Holder PA-C, was present and assisted the entire procedure. She assisted with abdominal entry, port placement, docking of robotic arms, adequate visualization, retraction, uterine manipulation, bedside assisting via assistant port, surgical specimen handling/retrieval, undocking of robotic arms, and skin closure.      The laparotomy sponge was removed from the vagina. The Medina catheter was removed from the bladder. Repeat vaginal examination was performed with findings noted above. There was no vaginal bleeding at the conclusion of the procedure.    All sponge, lap, needle and instrument counts were correct x 2. The patient tolerated the procedure well and there were no complications. She was returned to the supine position and general anesthesia was reversed without difficulty. An abdominal binder was placed on the patient. She was taken to the recovery room in stable condition. I was present and scrubbed the entire procedure.    Nora Chang MD  Gynecologic Oncology  [facility]   [facility]  01/06/2025

## 2025-01-07 NOTE — ANESTHESIA CARE TRANSFER NOTE
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    Anesthesia Type:   General     Note:    Oropharynx: oropharynx clear of all foreign objects  Level of Consciousness: awake  Oxygen Supplementation: face mask  Level of Supplemental Oxygen (L/min / FiO2): 10  Independent Airway: airway patency satisfactory and stable  Dentition: dentition unchanged  Vital Signs Stable: post-procedure vital signs reviewed and stable  Report to RN Given: handoff report given  Patient transferred to: PACU    Handoff Report: Identifed the Patient, Identified the Reponsible Provider, Reviewed the pertinent medical history, Discussed the surgical course, Reviewed Intra-OP anesthesia mangement and issues during anesthesia, Set expectations for post-procedure period and Allowed opportunity for questions and acknowledgement of understanding  Vitals:  Vitals Value Taken Time   BP     Temp     Pulse     Resp     SpO2 99 % 01/06/25 1810   Vitals shown include unfiled device data.    Electronically Signed By: RONNY Lara CRNA  January 6, 2025  6:11 PM

## 2025-01-07 NOTE — DISCHARGE INSTRUCTIONS
Same Day Surgery Discharge Instructions for  Sedation and General Anesthesia     It's not unusual to feel dizzy, light-headed or faint for up to 24 hours after surgery or while taking pain medication.  If you have these symptoms: sit for a few minutes before standing and have someone assist you when you get up to walk or use the bathroom.    You should rest and relax for the next 24 hours. We recommend you make arrangements to have an adult stay with you for at least 24 hours after your discharge.  Avoid hazardous and strenuous activity.    DO NOT DRIVE any vehicle or operate mechanical equipment for 24 hours following the end of your surgery.  Even though you may feel normal, your reactions may be affected by the medication you have received.    Do not drink alcoholic beverages for 24 hours following surgery.     Slowly progress to your regular diet as you feel able. It's not unusual to feel nauseated and/or vomit after receiving anesthesia.  If you develop these symptoms, drink clear liquids (apple juice, ginger ale, broth, 7-up, etc. ) until you feel better.  If your nausea and vomiting persists for 24 hours, please notify your surgeon.      All narcotic pain medications, along with inactivity and anesthesia, can cause constipation. Drinking plenty of liquids and increasing fiber intake will help.    For any questions of a medical nature, call your surgeon.    Do not make important decisions for 24 hours.    If you had general anesthesia, you may have a sore throat for a couple of days related to the breathing tube used during surgery.  You may use Cepacol lozenges to help with this discomfort.  If it worsens or if you develop a fever, contact your surgeon.     If you feel your pain is not well managed with the pain medications prescribed by your surgeon, please contact your surgeon's office to let them know so they can address your concerns.      Today you received Toradol, an antiinflammatory medication similar  to Ibuprofen.  You should not take other antiinflammatory medication, such as Ibuprofen, Motrin, Advil, Aleve, Naprosyn, etc until 11:45 PM.     **If you have questions or concerns about your procedure,   call Dr. Chang at [phone]**

## 2025-01-08 LAB
PATH REPORT.COMMENTS IMP SPEC: NORMAL
PATH REPORT.FINAL DX SPEC: NORMAL
PATH REPORT.GROSS SPEC: NORMAL
PATH REPORT.MICROSCOPIC SPEC OTHER STN: NORMAL
PATH REPORT.RELEVANT HX SPEC: NORMAL

## 2025-01-08 PROCEDURE — 88112 CYTOPATH CELL ENHANCE TECH: CPT | Mod: 26 | Performed by: PATHOLOGY

## 2025-01-08 PROCEDURE — 88305 TISSUE EXAM BY PATHOLOGIST: CPT | Mod: 26 | Performed by: PATHOLOGY

## 2025-01-10 LAB
PATH REPORT.COMMENTS IMP SPEC: ABNORMAL
PATH REPORT.COMMENTS IMP SPEC: YES
PATH REPORT.FINAL DX SPEC: ABNORMAL
PATH REPORT.GROSS SPEC: ABNORMAL
PATH REPORT.INTRAOP OBS SPEC DOC: ABNORMAL
PATH REPORT.MICROSCOPIC SPEC OTHER STN: ABNORMAL
PATH REPORT.RELEVANT HX SPEC: ABNORMAL
PATHOLOGY SYNOPTIC REPORT: ABNORMAL
PHOTO IMAGE: ABNORMAL

## 2025-01-10 PROCEDURE — 88331 PATH CONSLTJ SURG 1 BLK 1SPC: CPT | Mod: 26 | Performed by: PATHOLOGY

## 2025-01-10 PROCEDURE — 88305 TISSUE EXAM BY PATHOLOGIST: CPT | Mod: 26 | Performed by: PATHOLOGY

## 2025-01-10 PROCEDURE — 88307 TISSUE EXAM BY PATHOLOGIST: CPT | Mod: 26 | Performed by: PATHOLOGY

## 2025-01-23 ENCOUNTER — TRANSFERRED RECORDS (OUTPATIENT)
Dept: HEALTH INFORMATION MANAGEMENT | Facility: CLINIC | Age: 70
End: 2025-01-23
Payer: COMMERCIAL

## 2025-01-29 ENCOUNTER — LAB (OUTPATIENT)
Dept: INFUSION THERAPY | Facility: HOSPITAL | Age: 70
End: 2025-01-29
Attending: SURGERY
Payer: COMMERCIAL

## 2025-01-29 ENCOUNTER — PRE VISIT (OUTPATIENT)
Dept: ONCOLOGY | Facility: CLINIC | Age: 70
End: 2025-01-29
Payer: COMMERCIAL

## 2025-01-29 ENCOUNTER — ONCOLOGY VISIT (OUTPATIENT)
Dept: ONCOLOGY | Facility: HOSPITAL | Age: 70
End: 2025-01-29
Attending: SURGERY
Payer: COMMERCIAL

## 2025-01-29 ENCOUNTER — PATIENT OUTREACH (OUTPATIENT)
Dept: ONCOLOGY | Facility: HOSPITAL | Age: 70
End: 2025-01-29

## 2025-01-29 VITALS
DIASTOLIC BLOOD PRESSURE: 56 MMHG | OXYGEN SATURATION: 96 % | SYSTOLIC BLOOD PRESSURE: 120 MMHG | HEIGHT: 66 IN | TEMPERATURE: 97.5 F | WEIGHT: 164 LBS | RESPIRATION RATE: 20 BRPM | HEART RATE: 79 BPM | BODY MASS INDEX: 26.36 KG/M2

## 2025-01-29 DIAGNOSIS — D3A.8 NEUROENDOCRINE TUMOR OF PANCREAS (H): ICD-10-CM

## 2025-01-29 DIAGNOSIS — C56.9 PRIMARY MUCINOUS ADENOCARCINOMA OF OVARY (H): Primary | ICD-10-CM

## 2025-01-29 LAB
ALBUMIN SERPL BCG-MCNC: 4.2 G/DL (ref 3.5–5.2)
ALP SERPL-CCNC: 71 U/L (ref 40–150)
ALT SERPL W P-5'-P-CCNC: 29 U/L (ref 0–50)
ANION GAP SERPL CALCULATED.3IONS-SCNC: 9 MMOL/L (ref 7–15)
AST SERPL W P-5'-P-CCNC: 28 U/L (ref 0–45)
BASOPHILS # BLD AUTO: 0.1 10E3/UL (ref 0–0.2)
BASOPHILS NFR BLD AUTO: 1 %
BILIRUB SERPL-MCNC: 0.2 MG/DL
BUN SERPL-MCNC: 17.9 MG/DL (ref 8–23)
CALCIUM SERPL-MCNC: 9.8 MG/DL (ref 8.8–10.4)
CHLORIDE SERPL-SCNC: 103 MMOL/L (ref 98–107)
CREAT SERPL-MCNC: 0.81 MG/DL (ref 0.51–0.95)
EGFRCR SERPLBLD CKD-EPI 2021: 78 ML/MIN/1.73M2
EOSINOPHIL # BLD AUTO: 0.6 10E3/UL (ref 0–0.7)
EOSINOPHIL NFR BLD AUTO: 7 %
ERYTHROCYTE [DISTWIDTH] IN BLOOD BY AUTOMATED COUNT: 13.7 % (ref 10–15)
GLUCOSE SERPL-MCNC: 92 MG/DL (ref 70–99)
HCO3 SERPL-SCNC: 30 MMOL/L (ref 22–29)
HCT VFR BLD AUTO: 35.7 % (ref 35–47)
HGB BLD-MCNC: 11.2 G/DL (ref 11.7–15.7)
IMM GRANULOCYTES # BLD: 0.1 10E3/UL
IMM GRANULOCYTES NFR BLD: 2 %
LYMPHOCYTES # BLD AUTO: 1.8 10E3/UL (ref 0.8–5.3)
LYMPHOCYTES NFR BLD AUTO: 23 %
MCH RBC QN AUTO: 29.6 PG (ref 26.5–33)
MCHC RBC AUTO-ENTMCNC: 31.4 G/DL (ref 31.5–36.5)
MCV RBC AUTO: 94 FL (ref 78–100)
MONOCYTES # BLD AUTO: 0.8 10E3/UL (ref 0–1.3)
MONOCYTES NFR BLD AUTO: 11 %
NEUTROPHILS # BLD AUTO: 4.3 10E3/UL (ref 1.6–8.3)
NEUTROPHILS NFR BLD AUTO: 56 %
NRBC # BLD AUTO: 0 10E3/UL
NRBC BLD AUTO-RTO: 0 /100
PLATELET # BLD AUTO: 621 10E3/UL (ref 150–450)
POTASSIUM SERPL-SCNC: 4.2 MMOL/L (ref 3.4–5.3)
PROT SERPL-MCNC: 7.3 G/DL (ref 6.4–8.3)
RBC # BLD AUTO: 3.79 10E6/UL (ref 3.8–5.2)
SODIUM SERPL-SCNC: 142 MMOL/L (ref 135–145)
WBC # BLD AUTO: 7.8 10E3/UL (ref 4–11)

## 2025-01-29 PROCEDURE — 99205 OFFICE O/P NEW HI 60 MIN: CPT | Performed by: INTERNAL MEDICINE

## 2025-01-29 PROCEDURE — G0463 HOSPITAL OUTPT CLINIC VISIT: HCPCS | Performed by: INTERNAL MEDICINE

## 2025-01-29 PROCEDURE — 36415 COLL VENOUS BLD VENIPUNCTURE: CPT

## 2025-01-29 PROCEDURE — 85004 AUTOMATED DIFF WBC COUNT: CPT

## 2025-01-29 PROCEDURE — G2211 COMPLEX E/M VISIT ADD ON: HCPCS | Performed by: INTERNAL MEDICINE

## 2025-01-29 PROCEDURE — 82040 ASSAY OF SERUM ALBUMIN: CPT

## 2025-01-29 RX ORDER — POLYETHYLENE GLYCOL 3350 17 G/17G
1 POWDER, FOR SOLUTION ORAL DAILY
COMMUNITY

## 2025-01-29 ASSESSMENT — PAIN SCALES - GENERAL: PAINLEVEL_OUTOF10: MILD PAIN (3)

## 2025-01-29 NOTE — LETTER
1/29/2025      Marie Puente  [address]  [facility] [address]      Dear Colleague,    Thank you for referring your patient, Marie Puente, to the [facility] [place]. Please see a copy of my visit note below.    [facility] Hematology and Oncology Consult Note    Patient: Marie Puente  MRN: [id number]  Date of Service: 01/29/2025      Reason for Visit    Chief Complaint   Patient presents with     Oncology Clinic Visit     New patient consult         Assessment/Plan    Problem List Items Addressed This Visit       Neuroendocrine tumor of pancreas (H)    Relevant Orders    CBC with platelets and differential (Completed)    Comprehensive metabolic panel (BMP + Alb, Alk Phos, ALT, AST, Total. Bili, TP) (Completed)    MR Abdomen w/o & w Contrast     Other Visit Diagnoses       Primary mucinous adenocarcinoma of ovary (H)    -  Primary          Pancreatic neuroendocrine tumor, well-differentiated (grade 1)  Nonfunctioning tumor  Tumor diameter: 1.4 cm  Ki-67 index: 3%  I have reviewed her chart in detail.  Incidentally found pancreatic tail lesion after she presented with abdominal fullness and was found to have a large pelvic mass.  On dotatate scan it was highly radiotracer avid consistent with neuroendocrine tumor.  No evidence of any metastatic disease elsewhere.  Endoscopic ultrasound guided biopsy consistent with well-differentiated grade 1 pancreatic neuroendocrine tumor.  Appears to be nonfunctional since she does not have any signs or symptoms of hormone producing tumor.  Since the tumor diameter was measuring less than 2 cm, observation was recommended by surgery.  I reviewed the diagnosis of localized pancreatic neuroendocrine tumor.  I reviewed its natural course, prognosis and treatment options.  For nonfunctioning tumors measuring less than 2 cm, observation is generally preferred over surgery due to the slow growth of the tumor and morbidity associated  with the surgery.  I reviewed surveillance options.  She will need some sort of an imaging of the abdomen going forward.  Plan is to check her CBC and CMP today.  Will check an MRI of the abdomen in about 6 months.  Following that we will do imaging once every 6 to 12 months going forward and keep an eye on the size.  If the tumor continues to grow then we will potentially refer her back to surgery in the absence of metastatic disease.  She is agreeable to the plan.  I asked her to watch for signs and symptoms of a functioning tumor including hypoglycemic symptoms, flushing, diarrhea, abdominal pain etc.  So far she is doing okay.      Mucinous adenocarcinoma of the left ovary  She underwent robotic assisted laparoscopic hysterectomy and bilateral salpingo-oophorectomy.  Surgical path showing mucinous adenocarcinoma involving left ovary.  Peritoneal washings and peritoneal biopsy negative for any malignancy.  It was staged as pT1a.  Lymph nodes were not submitted.  Not clear if it is of primary ovarian origin or metastatic disease from elsewhere.  She has follow-ups with gynecologic oncology.  Also has upper and lower endoscopy scheduled to look for any other primary sources.  I will defer further management to gynecologic oncology.  If her stage remains as 1A then I do not think she needs any systemic therapy.  She has follow-ups with GYN.    She does complain of mild right lower extremity pain especially in the popliteal fossa.  This happened after surgery.  No swelling or redness.  I asked her to keep an eye on it and if the pain worsens or if she develops any swelling then I have a low threshold to obtain an ultrasound.      ECOG Performance    0 - Independent    Problem List    Patient Active Problem List   Diagnosis     Impingement syndrome of shoulder region, right     Neuroendocrine tumor of pancreas (H)     Pelvic mass      ______________________________________________________________________________    Staging History     Cancer Staging   No matching staging information was found for the patient.        History of presenting illness:  Marie Puente is a 69-year-old female with a new diagnosis of pancreatic neuroendocrine tumor and mucinous adenocarcinoma involving left ovary who has been referred by surgery for further management of localized pancreatic neuroendocrine tumor.    She presented to her PCP in October 2024 with sinusitis type symptoms.  At the time she had mentioned some abdominal fullness.  She had felt a mass in that area.  CT abdomen and pelvis Done on October 31, 2024 showed a large heterogeneous subserosal fibroid measuring 12.2 x 9.2 x 1.5 cm extending to the ventral abdomen/pelvis.  She also had an incidentally found 1.0 x 1.2 cm enhancing lesion in the pancreatic tail.  Following this she had a dotatate PET scan on 11/15/2024 which showed pancreatic tail a soft tissue nodule with intense radiotracer uptake consistent with neuroendocrine tumor.  No evidence of any disease elsewhere.  She also had large mass in the pelvis as noted in the previous CT scan.  She underwent endoscopic ultrasound-guided biopsy of the pancreatic tail mass which came back showing well-differentiated neuroendocrine tumor with a Ki-67 index of 3%.  She was seen at the [facility] surgery for the pancreatic neuroendocrine tumor and since the tumor was measuring less than 2 cm, observation was recommended.  From a pelvic mass she was seen by gynecologic oncology who performed robotic assisted total laparoscopic hysterectomy, bilateral salpingo-oophorectomy along with left ureterolysis, pelvic washings and omental biopsy.  Surgical path from that surgery came back showing mucinous adenocarcinoma involving left ovary measuring approximately 20.5 cm.  It appeared well-differentiated and there was background of mucinous  borderline tumor with foci of endometrial carcinoma.  Final pathologic T staging was T1a.  FIGO stage Ia.  Lymph nodes were not removed.  Right ovary was negative for any malignancy.  Peritoneal washings were negative for malignancy.  Peritoneal biopsy was negative for malignancy.  Looks like she is due for an upper endoscopy and colonoscopy to look for a primary source for her mucinous adenocarcinoma.    Denies any diarrhea or abdominal pain.  No sweating or flushing episodes.  No hypoglycemic symptoms.  No family history of any pancreatic tumors.      Past History    Past Medical History:   Diagnosis Date     Asthma      Bilateral sensorineural hearing loss      Diverticulosis      Enlarged uterus      History of colonic polyps      Hyperglycemia      Hyperlipidemia      Pancreatic head neuroendocrine tumor grade I      Uterine fibroid     No family history on file.   Past Surgical History:   Procedure Laterality Date     BASAL CELL SKIN CA ON BACK SQUAMOUS CELL SKIN CA,-MOHS      X3     DAVINCI HYSTERECTOMY TOTAL, BILATERAL SALPINGO-OOPHORECTMY, NODE DISSECTION, TUMOR STAGING, COMBINED Bilateral 1/6/2025    Procedure: Robotic-assisted total laparoscopic hysterectomy, bilateral salpingo-oophorectomy, left ureterolysis, pelvic washings, omental biopsy;  Surgeon: Nora Chang MD;  Location: [facility] OR     ENDOSCOPIC ULTRASOUND UPPER GASTROINTESTINAL TRACT (GI) N/A 12/05/2024    Procedure: ENDOSCOPIC ULTRASOUND, ESOPHAGOSCOPY / UPPER GASTROINTESTINAL TRACT (GI) with needle biopsy;  Surgeon: Daryl Morales MD;  Location: [facility] OR     EXPLORATORY LAP PELVIS      Social History     Socioeconomic History     Marital status:      Spouse name: Not on file     Number of children: Not on file     Years of education: Not on file     Highest education level: Not on file   Occupational History     Not on file   Tobacco Use     Smoking status: Never     Passive exposure: Past (childhood exposure in the home until  age 17)     Smokeless tobacco: Never   Vaping Use     Vaping status: Never Used   Substance and Sexual Activity     Alcohol use: Yes     Comment: Rare     Drug use: Never     Sexual activity: Not on file   Other Topics Concern     Not on file   Social History Narrative     Not on file     Social Drivers of Health     Financial Resource Strain: Not on file   Food Insecurity: Not on file   Transportation Needs: Not on file   Physical Activity: Not on file   Stress: Not on file   Social Connections: Not on file   Interpersonal Safety: Low Risk  (12/5/2024)    Interpersonal Safety      Do you feel physically and emotionally safe where you currently live?: Yes      Within the past 12 months, have you been hit, slapped, kicked or otherwise physically hurt by someone?: No      Within the past 12 months, have you been humiliated or emotionally abused in other ways by your partner or ex-partner?: No   Housing Stability: Not on file        Allergies    Allergies   Allergen Reactions     Mold Difficulty breathing and Shortness Of Breath     Dust Mites Difficulty breathing     Sulfa Antibiotics Hives and Swelling       Review of Systems    Pertinent items are noted in HPI.      Physical Exam        1/29/2025     1:01 PM   Oncology Vitals   Height 168 cm   Weight 74.39 kg   BSA (m2) 1.86 m2   /56   Pulse 79   Temp 97.5  F (36.4  C)   Temp src Tympanic   SpO2 96 %   Pain Score 3 (Mild)   Pain Loc Abdomen       General: alert and cooperative  HEENT: Head: Normal, normocephalic, atraumatic.  Eye: Normal external eye, conjunctiva, lids cornea, DOUGLAS.  Extremities: atraumatic, no peripheral edema  CNS: Alert and oriented x3, neurologic exam grossly normal.        Lab Results    Recent Results (from the past week)   Comprehensive metabolic panel (BMP + Alb, Alk Phos, ALT, AST, Total. Bili, TP)   Result Value Ref Range    Sodium 142 135 - 145 mmol/L    Potassium 4.2 3.4 - 5.3 mmol/L    Carbon Dioxide (CO2) 30 (H) 22 - 29 mmol/L     Anion Gap 9 7 - 15 mmol/L    Urea Nitrogen 17.9 8.0 - 23.0 mg/dL    Creatinine 0.81 0.51 - 0.95 mg/dL    GFR Estimate 78 >60 mL/min/1.73m2    Calcium 9.8 8.8 - 10.4 mg/dL    Chloride 103 98 - 107 mmol/L    Glucose 92 70 - 99 mg/dL    Alkaline Phosphatase 71 40 - 150 U/L    AST 28 0 - 45 U/L    ALT 29 0 - 50 U/L    Protein Total 7.3 6.4 - 8.3 g/dL    Albumin 4.2 3.5 - 5.2 g/dL    Bilirubin Total 0.2 <=1.2 mg/dL   CBC with platelets and differential   Result Value Ref Range    WBC Count 7.8 4.0 - 11.0 10e3/uL    RBC Count 3.79 (L) 3.80 - 5.20 10e6/uL    Hemoglobin 11.2 (L) 11.7 - 15.7 g/dL    Hematocrit 35.7 35.0 - 47.0 %    MCV 94 78 - 100 fL    MCH 29.6 26.5 - 33.0 pg    MCHC 31.4 (L) 31.5 - 36.5 g/dL    RDW 13.7 10.0 - 15.0 %    Platelet Count 621 (H) 150 - 450 10e3/uL    % Neutrophils 56 %    % Lymphocytes 23 %    % Monocytes 11 %    % Eosinophils 7 %    % Basophils 1 %    % Immature Granulocytes 2 %    NRBCs per 100 WBC 0 <1 /100    Absolute Neutrophils 4.3 1.6 - 8.3 10e3/uL    Absolute Lymphocytes 1.8 0.8 - 5.3 10e3/uL    Absolute Monocytes 0.8 0.0 - 1.3 10e3/uL    Absolute Eosinophils 0.6 0.0 - 0.7 10e3/uL    Absolute Basophils 0.1 0.0 - 0.2 10e3/uL    Absolute Immature Granulocytes 0.1 <=0.4 10e3/uL    Absolute NRBCs 0.0 10e3/uL       Imaging Results    No results found.    A total of 60 minutes was spent today on this visit including face to face conversation with the patient, EMR review (labs, imaging studies, pathology reports and outside records), counseling and care co-ordination and documentation.    The longitudinal plan of care for the diagnosis(es)/condition(s) as documented were addressed during this visit. Due to the added complexity in care, I will continue to support Guillermina in the subsequent management and with ongoing continuity of care.      Signed by: Gisella Ledesma MD      Oncology Rooming Note    January 29, 2025 1:06 PM   Marie FANNIE Puente is a 69 year old female who presents  "for:    Chief Complaint   Patient presents with     Oncology Clinic Visit     New patient consult     Initial Vitals: /56 (BP Location: Left arm, Patient Position: Sitting, Cuff Size: Adult Regular)   Pulse 79   Temp 97.5  F (36.4  C) (Tympanic)   Resp 20   Ht 1.683 m (5' 6.25\")   Wt 74.4 kg (164 lb)   SpO2 96%   BMI 26.27 kg/m   Estimated body mass index is 26.27 kg/m  as calculated from the following:    Height as of this encounter: 1.683 m (5' 6.25\").    Weight as of this encounter: 74.4 kg (164 lb). Body surface area is 1.86 meters squared.  Mild Pain (3) Comment: Data Unavailable   No LMP recorded. Patient has had a hysterectomy.  Allergies reviewed: Yes  Medications reviewed: Yes    Medications: Medication refills not needed today.  Pharmacy name entered into [employer]: [facility] [address] [place] [address]    Frailty Screening:   Is the patient here for a new oncology consult visit in cancer care? 1. Yes. Over the past month, have you experienced difficulty or required a caregiver to assist with:   1. Balance, walking or general mobility (including any falls)? NO  2. Completion of self-care tasks such as bathing, dressing, toileting, grooming/hygiene?  NO  3. Concentration or memory that affects your daily life?  NO       Clinical concerns: discuss concerns of right leg/calf pain after surgery getting more sore  Dr. Ledesma  was notified.      ANH MARINELLI CMA                Again, thank you for allowing me to participate in the care of your patient.        Sincerely,        Gisella Leedsma MD    Electronically signed"

## 2025-01-29 NOTE — PROGRESS NOTES
Situation: Patient chart reviewed by care coordinator.    Background: New patient with Dr. Ledesma today with neuroendocrine tumor of pancreas. Recent hysterectomy for pelvic mass.    Assessment: Pt stable on observation for tumor.     Plan/Recommendations: Follow up with Dr. Ledesma in 6 months with MRI ABD prior.    Juli Hampton RN

## 2025-01-29 NOTE — PROGRESS NOTES
"Oncology Rooming Note    January 29, 2025 1:06 PM   Marie Puente is a 69 year old female who presents for:    Chief Complaint   Patient presents with    Oncology Clinic Visit     New patient consult     Initial Vitals: /56 (BP Location: Left arm, Patient Position: Sitting, Cuff Size: Adult Regular)   Pulse 79   Temp 97.5  F (36.4  C) (Tympanic)   Resp 20   Ht 1.683 m (5' 6.25\")   Wt 74.4 kg (164 lb)   SpO2 96%   BMI 26.27 kg/m   Estimated body mass index is 26.27 kg/m  as calculated from the following:    Height as of this encounter: 1.683 m (5' 6.25\").    Weight as of this encounter: 74.4 kg (164 lb). Body surface area is 1.86 meters squared.  Mild Pain (3) Comment: Data Unavailable   No LMP recorded. Patient has had a hysterectomy.  Allergies reviewed: Yes  Medications reviewed: Yes    Medications: Medication refills not needed today.  Pharmacy name entered into [employer]: [facility] [address] [place] [address]    Frailty Screening:   Is the patient here for a new oncology consult visit in cancer care? 1. Yes. Over the past month, have you experienced difficulty or required a caregiver to assist with:   1. Balance, walking or general mobility (including any falls)? NO  2. Completion of self-care tasks such as bathing, dressing, toileting, grooming/hygiene?  NO  3. Concentration or memory that affects your daily life?  NO       Clinical concerns: discuss concerns of right leg/calf pain after surgery getting more sore  Dr. Ledesma  was notified.      ANH MARINELLI CMA              "

## 2025-01-29 NOTE — PROGRESS NOTES
[facility] Hematology and Oncology Consult Note    Patient: Marie Puente  MRN: [id number]  Date of Service: 01/29/2025      Reason for Visit    Chief Complaint   Patient presents with    Oncology Clinic Visit     New patient consult         Assessment/Plan    Problem List Items Addressed This Visit       Neuroendocrine tumor of pancreas (H)    Relevant Orders    CBC with platelets and differential (Completed)    Comprehensive metabolic panel (BMP + Alb, Alk Phos, ALT, AST, Total. Bili, TP) (Completed)    MR Abdomen w/o & w Contrast     Other Visit Diagnoses       Primary mucinous adenocarcinoma of ovary (H)    -  Primary          Pancreatic neuroendocrine tumor, well-differentiated (grade 1)  Nonfunctioning tumor  Tumor diameter: 1.4 cm  Ki-67 index: 3%  I have reviewed her chart in detail.  Incidentally found pancreatic tail lesion after she presented with abdominal fullness and was found to have a large pelvic mass.  On dotatate scan it was highly radiotracer avid consistent with neuroendocrine tumor.  No evidence of any metastatic disease elsewhere.  Endoscopic ultrasound guided biopsy consistent with well-differentiated grade 1 pancreatic neuroendocrine tumor.  Appears to be nonfunctional since she does not have any signs or symptoms of hormone producing tumor.  Since the tumor diameter was measuring less than 2 cm, observation was recommended by surgery.  I reviewed the diagnosis of localized pancreatic neuroendocrine tumor.  I reviewed its natural course, prognosis and treatment options.  For nonfunctioning tumors measuring less than 2 cm, observation is generally preferred over surgery due to the slow growth of the tumor and morbidity associated with the surgery.  I reviewed surveillance options.  She will need some sort of an imaging of the abdomen going forward.  Plan is to check her CBC and CMP today.  Will check an MRI of the abdomen in about 6 months.  Following that we will do imaging once  every 6 to 12 months going forward and keep an eye on the size.  If the tumor continues to grow then we will potentially refer her back to surgery in the absence of metastatic disease.  She is agreeable to the plan.  I asked her to watch for signs and symptoms of a functioning tumor including hypoglycemic symptoms, flushing, diarrhea, abdominal pain etc.  So far she is doing okay.      Mucinous adenocarcinoma of the left ovary  She underwent robotic assisted laparoscopic hysterectomy and bilateral salpingo-oophorectomy.  Surgical path showing mucinous adenocarcinoma involving left ovary.  Peritoneal washings and peritoneal biopsy negative for any malignancy.  It was staged as pT1a.  Lymph nodes were not submitted.  Not clear if it is of primary ovarian origin or metastatic disease from elsewhere.  She has follow-ups with gynecologic oncology.  Also has upper and lower endoscopy scheduled to look for any other primary sources.  I will defer further management to gynecologic oncology.  If her stage remains as 1A then I do not think she needs any systemic therapy.  She has follow-ups with GYN.    She does complain of mild right lower extremity pain especially in the popliteal fossa.  This happened after surgery.  No swelling or redness.  I asked her to keep an eye on it and if the pain worsens or if she develops any swelling then I have a low threshold to obtain an ultrasound.      ECOG Performance    0 - Independent    Problem List    Patient Active Problem List   Diagnosis    Impingement syndrome of shoulder region, right    Neuroendocrine tumor of pancreas (H)    Pelvic mass     ______________________________________________________________________________    Staging History     Cancer Staging   No matching staging information was found for the patient.        History of presenting illness:  Marie Puente is a 69-year-old female with a new diagnosis of pancreatic neuroendocrine tumor and mucinous adenocarcinoma  involving left ovary who has been referred by surgery for further management of localized pancreatic neuroendocrine tumor.    She presented to her PCP in October 2024 with sinusitis type symptoms.  At the time she had mentioned some abdominal fullness.  She had felt a mass in that area.  CT abdomen and pelvis Done on October 31, 2024 showed a large heterogeneous subserosal fibroid measuring 12.2 x 9.2 x 1.5 cm extending to the ventral abdomen/pelvis.  She also had an incidentally found 1.0 x 1.2 cm enhancing lesion in the pancreatic tail.  Following this she had a dotatate PET scan on 11/15/2024 which showed pancreatic tail a soft tissue nodule with intense radiotracer uptake consistent with neuroendocrine tumor.  No evidence of any disease elsewhere.  She also had large mass in the pelvis as noted in the previous CT scan.  She underwent endoscopic ultrasound-guided biopsy of the pancreatic tail mass which came back showing well-differentiated neuroendocrine tumor with a Ki-67 index of 3%.  She was seen at the [facility] surgery for the pancreatic neuroendocrine tumor and since the tumor was measuring less than 2 cm, observation was recommended.  From a pelvic mass she was seen by gynecologic oncology who performed robotic assisted total laparoscopic hysterectomy, bilateral salpingo-oophorectomy along with left ureterolysis, pelvic washings and omental biopsy.  Surgical path from that surgery came back showing mucinous adenocarcinoma involving left ovary measuring approximately 20.5 cm.  It appeared well-differentiated and there was background of mucinous borderline tumor with foci of endometrial carcinoma.  Final pathologic T staging was T1a.  FIGO stage Ia.  Lymph nodes were not removed.  Right ovary was negative for any malignancy.  Peritoneal washings were negative for malignancy.  Peritoneal biopsy was negative for malignancy.  Looks like she is due for an upper endoscopy and colonoscopy to look  for a primary source for her mucinous adenocarcinoma.    Denies any diarrhea or abdominal pain.  No sweating or flushing episodes.  No hypoglycemic symptoms.  No family history of any pancreatic tumors.      Past History    Past Medical History:   Diagnosis Date    Asthma     Bilateral sensorineural hearing loss     Diverticulosis     Enlarged uterus     History of colonic polyps     Hyperglycemia     Hyperlipidemia     Pancreatic head neuroendocrine tumor grade I     Uterine fibroid     No family history on file.   Past Surgical History:   Procedure Laterality Date    BASAL CELL SKIN CA ON BACK SQUAMOUS CELL SKIN CA,-MOHS      X3    DAVINCI HYSTERECTOMY TOTAL, BILATERAL SALPINGO-OOPHORECTMY, NODE DISSECTION, TUMOR STAGING, COMBINED Bilateral 1/6/2025    Procedure: Robotic-assisted total laparoscopic hysterectomy, bilateral salpingo-oophorectomy, left ureterolysis, pelvic washings, omental biopsy;  Surgeon: Nora Chang MD;  Location: [facility] OR    ENDOSCOPIC ULTRASOUND UPPER GASTROINTESTINAL TRACT (GI) N/A 12/05/2024    Procedure: ENDOSCOPIC ULTRASOUND, ESOPHAGOSCOPY / UPPER GASTROINTESTINAL TRACT (GI) with needle biopsy;  Surgeon: Daryl Morales MD;  Location: [facility] OR    EXPLORATORY LAP PELVIS      Social History     Socioeconomic History    Marital status:      Spouse name: Not on file    Number of children: Not on file    Years of education: Not on file    Highest education level: Not on file   Occupational History    Not on file   Tobacco Use    Smoking status: Never     Passive exposure: Past (childhood exposure in the home until age 17)    Smokeless tobacco: Never   Vaping Use    Vaping status: Never Used   Substance and Sexual Activity    Alcohol use: Yes     Comment: Rare    Drug use: Never    Sexual activity: Not on file   Other Topics Concern    Not on file   Social History Narrative    Not on file     Social Drivers of Health     Financial Resource Strain: Not on file   Food Insecurity: Not  on file   Transportation Needs: Not on file   Physical Activity: Not on file   Stress: Not on file   Social Connections: Not on file   Interpersonal Safety: Low Risk  (12/5/2024)    Interpersonal Safety     Do you feel physically and emotionally safe where you currently live?: Yes     Within the past 12 months, have you been hit, slapped, kicked or otherwise physically hurt by someone?: No     Within the past 12 months, have you been humiliated or emotionally abused in other ways by your partner or ex-partner?: No   Housing Stability: Not on file        Allergies    Allergies   Allergen Reactions    Mold Difficulty breathing and Shortness Of Breath    Dust Mites Difficulty breathing    Sulfa Antibiotics Hives and Swelling       Review of Systems    Pertinent items are noted in HPI.      Physical Exam        1/29/2025     1:01 PM   Oncology Vitals   Height 168 cm   Weight 74.39 kg   BSA (m2) 1.86 m2   /56   Pulse 79   Temp 97.5  F (36.4  C)   Temp src Tympanic   SpO2 96 %   Pain Score 3 (Mild)   Pain Loc Abdomen       General: alert and cooperative  HEENT: Head: Normal, normocephalic, atraumatic.  Eye: Normal external eye, conjunctiva, lids cornea, DOUGLAS.  Extremities: atraumatic, no peripheral edema  CNS: Alert and oriented x3, neurologic exam grossly normal.        Lab Results    Recent Results (from the past week)   Comprehensive metabolic panel (BMP + Alb, Alk Phos, ALT, AST, Total. Bili, TP)   Result Value Ref Range    Sodium 142 135 - 145 mmol/L    Potassium 4.2 3.4 - 5.3 mmol/L    Carbon Dioxide (CO2) 30 (H) 22 - 29 mmol/L    Anion Gap 9 7 - 15 mmol/L    Urea Nitrogen 17.9 8.0 - 23.0 mg/dL    Creatinine 0.81 0.51 - 0.95 mg/dL    GFR Estimate 78 >60 mL/min/1.73m2    Calcium 9.8 8.8 - 10.4 mg/dL    Chloride 103 98 - 107 mmol/L    Glucose 92 70 - 99 mg/dL    Alkaline Phosphatase 71 40 - 150 U/L    AST 28 0 - 45 U/L    ALT 29 0 - 50 U/L    Protein Total 7.3 6.4 - 8.3 g/dL    Albumin 4.2 3.5 - 5.2 g/dL     Bilirubin Total 0.2 <=1.2 mg/dL   CBC with platelets and differential   Result Value Ref Range    WBC Count 7.8 4.0 - 11.0 10e3/uL    RBC Count 3.79 (L) 3.80 - 5.20 10e6/uL    Hemoglobin 11.2 (L) 11.7 - 15.7 g/dL    Hematocrit 35.7 35.0 - 47.0 %    MCV 94 78 - 100 fL    MCH 29.6 26.5 - 33.0 pg    MCHC 31.4 (L) 31.5 - 36.5 g/dL    RDW 13.7 10.0 - 15.0 %    Platelet Count 621 (H) 150 - 450 10e3/uL    % Neutrophils 56 %    % Lymphocytes 23 %    % Monocytes 11 %    % Eosinophils 7 %    % Basophils 1 %    % Immature Granulocytes 2 %    NRBCs per 100 WBC 0 <1 /100    Absolute Neutrophils 4.3 1.6 - 8.3 10e3/uL    Absolute Lymphocytes 1.8 0.8 - 5.3 10e3/uL    Absolute Monocytes 0.8 0.0 - 1.3 10e3/uL    Absolute Eosinophils 0.6 0.0 - 0.7 10e3/uL    Absolute Basophils 0.1 0.0 - 0.2 10e3/uL    Absolute Immature Granulocytes 0.1 <=0.4 10e3/uL    Absolute NRBCs 0.0 10e3/uL       Imaging Results    No results found.    A total of 60 minutes was spent today on this visit including face to face conversation with the patient, EMR review (labs, imaging studies, pathology reports and outside records), counseling and care co-ordination and documentation.    The longitudinal plan of care for the diagnosis(es)/condition(s) as documented were addressed during this visit. Due to the added complexity in care, I will continue to support Guillermina in the subsequent management and with ongoing continuity of care.      Signed by: Gisella Ledesma MD

## 2025-02-04 ENCOUNTER — TELEPHONE (OUTPATIENT)
Dept: GASTROENTEROLOGY | Facility: CLINIC | Age: 70
End: 2025-02-04
Payer: COMMERCIAL

## 2025-02-04 NOTE — TELEPHONE ENCOUNTER
Pre visit planning completed.      Procedure details:    Patient scheduled for Colonoscopy/Upper endoscopy (EGD) on 2.18.25.     Arrival time: 1000. Procedure time 1130    Facility location: [facility]; [address] [place] [address], [address] Floor, [place] [address]. Check in location: Main entrance at registration desk.  *Disclaimer: Drivers are to check in with patient and stay on campus during procedure.     Sedation type: MAC    Pre op exam needed? No.    Indication for procedure: abnormal finding on gi tract       Chart review:     Electronic implanted devices? No    Recent diagnosis of diverticulitis within the last 6 weeks? No      Medication review:    Diabetic? No    Anticoagulants? No    Weight loss medication/injectable? No GLP-1 medication per patient's medication list. Nursing to verify with pre-assessment call.    Other medication HOLDING recommendations:  N/A      Prep for procedure:     Bowel prep recommendation: Standard Miralax.   Due to: standard bowel prep    Procedure information and instructions sent via [employer]         Leslie Esqueda RN  Endoscopy Procedure Pre Assessment   [phone] option 2

## 2025-02-04 NOTE — TELEPHONE ENCOUNTER
Attempted to contact patient in order to complete pre assessment questions.     No answer. Left message to return call to [phone] option 2    Callback communication sent via [employer].      Heidi Hernandez LPN  Endoscopy Procedure Pre Assessment

## 2025-02-05 NOTE — TELEPHONE ENCOUNTER
Pre assessment completed for upcoming procedure.   (Please see previous telephone encounter notes for complete details)    Patient returned call.       Procedure details:    Arrival time and facility location reviewed.    Designated  policy reviewed and that site requests drivers to check in and stay on campus. Instructed to have someone stay 24  hours post procedure.       Medication review:    Medications reviewed. Please see supporting documentation below. Holding recommendations discussed (if applicable).       Prep for procedure:    Procedure prep instructions reviewed.        Any additional information needed:  N/A      Patient verbalized understanding and had no questions or concerns at this time.      Ban Johnson RN  Endoscopy Procedure Pre Assessment   [phone] option 2

## 2025-02-17 ENCOUNTER — ANESTHESIA EVENT (OUTPATIENT)
Dept: GASTROENTEROLOGY | Facility: CLINIC | Age: 70
End: 2025-02-17
Payer: COMMERCIAL

## 2025-02-18 ENCOUNTER — HOSPITAL ENCOUNTER (OUTPATIENT)
Facility: CLINIC | Age: 70
Discharge: HOME | End: 2025-02-18
Attending: INTERNAL MEDICINE | Admitting: INTERNAL MEDICINE
Payer: COMMERCIAL

## 2025-02-18 ENCOUNTER — ANESTHESIA (OUTPATIENT)
Dept: GASTROENTEROLOGY | Facility: CLINIC | Age: 70
End: 2025-02-18
Payer: COMMERCIAL

## 2025-02-18 VITALS
SYSTOLIC BLOOD PRESSURE: 144 MMHG | RESPIRATION RATE: 16 BRPM | OXYGEN SATURATION: 99 % | DIASTOLIC BLOOD PRESSURE: 82 MMHG

## 2025-02-18 DIAGNOSIS — K26.9 DUODENAL ULCER: Primary | ICD-10-CM

## 2025-02-18 LAB
COLONOSCOPY: NORMAL
UPPER GI ENDOSCOPY: NORMAL

## 2025-02-18 PROCEDURE — 88305 TISSUE EXAM BY PATHOLOGIST: CPT | Mod: TC | Performed by: INTERNAL MEDICINE

## 2025-02-18 PROCEDURE — 43239 EGD BIOPSY SINGLE/MULTIPLE: CPT | Mod: XU | Performed by: INTERNAL MEDICINE

## 2025-02-18 PROCEDURE — 250N000009 HC RX 250: Performed by: NURSE ANESTHETIST, CERTIFIED REGISTERED

## 2025-02-18 PROCEDURE — 370N000017 HC ANESTHESIA TECHNICAL FEE, PER MIN: Performed by: INTERNAL MEDICINE

## 2025-02-18 PROCEDURE — 258N000003 HC RX IP 258 OP 636: Performed by: NURSE ANESTHETIST, CERTIFIED REGISTERED

## 2025-02-18 PROCEDURE — 45378 DIAGNOSTIC COLONOSCOPY: CPT | Performed by: INTERNAL MEDICINE

## 2025-02-18 PROCEDURE — 250N000011 HC RX IP 250 OP 636: Mod: JZ | Performed by: NURSE ANESTHETIST, CERTIFIED REGISTERED

## 2025-02-18 RX ORDER — NALOXONE HYDROCHLORIDE 0.4 MG/ML
0.4 INJECTION, SOLUTION INTRAMUSCULAR; INTRAVENOUS; SUBCUTANEOUS
Status: DISCONTINUED | OUTPATIENT
Start: 2025-02-18 | End: 2025-02-18 | Stop reason: HOSPADM

## 2025-02-18 RX ORDER — ONDANSETRON 2 MG/ML
4 INJECTION INTRAMUSCULAR; INTRAVENOUS EVERY 6 HOURS PRN
Status: DISCONTINUED | OUTPATIENT
Start: 2025-02-18 | End: 2025-02-18 | Stop reason: HOSPADM

## 2025-02-18 RX ORDER — NALOXONE HYDROCHLORIDE 0.4 MG/ML
0.2 INJECTION, SOLUTION INTRAMUSCULAR; INTRAVENOUS; SUBCUTANEOUS
Status: DISCONTINUED | OUTPATIENT
Start: 2025-02-18 | End: 2025-02-18 | Stop reason: HOSPADM

## 2025-02-18 RX ORDER — PROPOFOL 10 MG/ML
INJECTION, EMULSION INTRAVENOUS CONTINUOUS PRN
Status: DISCONTINUED | OUTPATIENT
Start: 2025-02-18 | End: 2025-02-18

## 2025-02-18 RX ORDER — PROPOFOL 10 MG/ML
INJECTION, EMULSION INTRAVENOUS PRN
Status: DISCONTINUED | OUTPATIENT
Start: 2025-02-18 | End: 2025-02-18

## 2025-02-18 RX ORDER — ONDANSETRON 4 MG/1
4 TABLET, ORALLY DISINTEGRATING ORAL EVERY 6 HOURS PRN
Status: DISCONTINUED | OUTPATIENT
Start: 2025-02-18 | End: 2025-02-18 | Stop reason: HOSPADM

## 2025-02-18 RX ORDER — LIDOCAINE HYDROCHLORIDE 20 MG/ML
INJECTION, SOLUTION INFILTRATION; PERINEURAL PRN
Status: DISCONTINUED | OUTPATIENT
Start: 2025-02-18 | End: 2025-02-18

## 2025-02-18 RX ORDER — FLUMAZENIL 0.1 MG/ML
0.2 INJECTION, SOLUTION INTRAVENOUS
Status: DISCONTINUED | OUTPATIENT
Start: 2025-02-18 | End: 2025-02-18 | Stop reason: HOSPADM

## 2025-02-18 RX ORDER — OMEPRAZOLE 20 MG/1
20 CAPSULE, DELAYED RELEASE ORAL DAILY
Qty: 60 CAPSULE | Refills: 0 | Status: SHIPPED | OUTPATIENT
Start: 2025-02-18 | End: 2025-04-19

## 2025-02-18 RX ORDER — GLYCOPYRROLATE 0.2 MG/ML
INJECTION, SOLUTION INTRAMUSCULAR; INTRAVENOUS PRN
Status: DISCONTINUED | OUTPATIENT
Start: 2025-02-18 | End: 2025-02-18

## 2025-02-18 RX ORDER — ONDANSETRON 2 MG/ML
4 INJECTION INTRAMUSCULAR; INTRAVENOUS
Status: DISCONTINUED | OUTPATIENT
Start: 2025-02-18 | End: 2025-02-18 | Stop reason: HOSPADM

## 2025-02-18 RX ORDER — PROCHLORPERAZINE MALEATE 5 MG/1
5 TABLET ORAL EVERY 6 HOURS PRN
Status: DISCONTINUED | OUTPATIENT
Start: 2025-02-18 | End: 2025-02-18 | Stop reason: HOSPADM

## 2025-02-18 RX ORDER — LIDOCAINE 40 MG/G
CREAM TOPICAL
Status: DISCONTINUED | OUTPATIENT
Start: 2025-02-18 | End: 2025-02-18 | Stop reason: HOSPADM

## 2025-02-18 RX ORDER — SODIUM CHLORIDE, SODIUM LACTATE, POTASSIUM CHLORIDE, CALCIUM CHLORIDE 600; 310; 30; 20 MG/100ML; MG/100ML; MG/100ML; MG/100ML
INJECTION, SOLUTION INTRAVENOUS CONTINUOUS PRN
Status: DISCONTINUED | OUTPATIENT
Start: 2025-02-18 | End: 2025-02-18

## 2025-02-18 RX ADMIN — LIDOCAINE HYDROCHLORIDE 50 MG: 20 INJECTION, SOLUTION INFILTRATION; PERINEURAL at 10:56

## 2025-02-18 RX ADMIN — PROPOFOL 50 MG: 10 INJECTION, EMULSION INTRAVENOUS at 10:56

## 2025-02-18 RX ADMIN — TOPICAL ANESTHETIC 1 EACH: 200 SPRAY DENTAL; PERIODONTAL at 10:50

## 2025-02-18 RX ADMIN — SODIUM CHLORIDE, POTASSIUM CHLORIDE, SODIUM LACTATE AND CALCIUM CHLORIDE: 600; 310; 30; 20 INJECTION, SOLUTION INTRAVENOUS at 10:51

## 2025-02-18 RX ADMIN — GLYCOPYRROLATE 0.2 MG: 0.2 INJECTION, SOLUTION INTRAMUSCULAR; INTRAVENOUS at 10:54

## 2025-02-18 RX ADMIN — PROPOFOL 150 MCG/KG/MIN: 10 INJECTION, EMULSION INTRAVENOUS at 10:56

## 2025-02-18 ASSESSMENT — ACTIVITIES OF DAILY LIVING (ADL)
ADLS_ACUITY_SCORE: 41

## 2025-02-18 NOTE — ANESTHESIA PREPROCEDURE EVALUATION
Anesthesia Pre-Procedure Evaluation    Patient: Marie Puente   MRN: [id number] : 1955        Procedure : Procedure(s):  Colonoscopy  Esophagoscopy, gastroscopy, duodenoscopy (EGD), combined          Past Medical History:   Diagnosis Date    Asthma     Bilateral sensorineural hearing loss     Diverticulosis     Enlarged uterus     History of colonic polyps     Hyperglycemia     Hyperlipidemia     Pancreatic head neuroendocrine tumor grade I     Uterine fibroid       Past Surgical History:   Procedure Laterality Date    BASAL CELL SKIN CA ON BACK SQUAMOUS CELL SKIN CA,-MOHS      X3    DAVINCI HYSTERECTOMY TOTAL, BILATERAL SALPINGO-OOPHORECTMY, NODE DISSECTION, TUMOR STAGING, COMBINED Bilateral 2025    Procedure: Robotic-assisted total laparoscopic hysterectomy, bilateral salpingo-oophorectomy, left ureterolysis, pelvic washings, omental biopsy;  Surgeon: Nora Chang MD;  Location: [facility] OR    ENDOSCOPIC ULTRASOUND UPPER GASTROINTESTINAL TRACT (GI) N/A 2024    Procedure: ENDOSCOPIC ULTRASOUND, ESOPHAGOSCOPY / UPPER GASTROINTESTINAL TRACT (GI) with needle biopsy;  Surgeon: Daryl Morales MD;  Location: [facility] OR    EXPLORATORY LAP PELVIS        Allergies   Allergen Reactions    Mold Difficulty breathing and Shortness Of Breath    Dust Mites Difficulty breathing    Sulfa Antibiotics Hives and Swelling      Social History     Tobacco Use    Smoking status: Never     Passive exposure: Past (childhood exposure in the home until age 17)    Smokeless tobacco: Never   Substance Use Topics    Alcohol use: Yes     Comment: Rare      Wt Readings from Last 1 Encounters:   25 74.4 kg (164 lb)        Anesthesia Evaluation   Pt has had prior anesthetic.     No history of anesthetic complications       ROS/MED HX  ENT/Pulmonary:     (+)                      asthma                  Neurologic:  - neg neurologic ROS     Cardiovascular:     (+) Dyslipidemia - -   -  - -                                 "      METS/Exercise Tolerance: 3 - Able to walk 1-2 blocks without stopping    Hematologic:       Musculoskeletal:       GI/Hepatic: Comment: Pancreatic tumor s/p bx, monitoring   (-) GERD   Renal/Genitourinary: Comment: Ovarian cancer s/p TLH BSO      Endo:       Psychiatric/Substance Use:       Infectious Disease:       Malignancy:       Other:            Physical Exam    Airway        Mallampati: II   TM distance: > 3 FB   Neck ROM: full   Mouth opening: > 3 cm    Respiratory Devices and Support         Dental     Comment: Patient reports no loose or chipped teeth        Cardiovascular          Rhythm and rate: regular and normal     Pulmonary           breath sounds clear to auscultation           OUTSIDE LABS:  CBC:   Lab Results   Component Value Date    WBC 7.8 01/29/2025    WBC 9.1 01/06/2025    HGB 11.2 (L) 01/29/2025    HGB 15.2 01/06/2025    HCT 35.7 01/29/2025    HCT 46.2 01/06/2025     (H) 01/29/2025     01/06/2025     BMP:   Lab Results   Component Value Date     01/29/2025    POTASSIUM 4.2 01/29/2025    CHLORIDE 103 01/29/2025    CO2 30 (H) 01/29/2025    BUN 17.9 01/29/2025    CR 0.81 01/29/2025    GLC 92 01/29/2025     COAGS: No results found for: \"PTT\", \"INR\", \"FIBR\"  POC: No results found for: \"BGM\", \"HCG\", \"HCGS\"  HEPATIC:   Lab Results   Component Value Date    ALBUMIN 4.2 01/29/2025    PROTTOTAL 7.3 01/29/2025    ALT 29 01/29/2025    AST 28 01/29/2025    ALKPHOS 71 01/29/2025    BILITOTAL 0.2 01/29/2025     OTHER:   Lab Results   Component Value Date    RAUDEL 9.8 01/29/2025       Anesthesia Plan    ASA Status:  3    NPO Status:  NPO Appropriate    Anesthesia Type: MAC.              Consents    Anesthesia Plan(s) and associated risks, benefits, and realistic alternatives discussed. Questions answered and patient/representative(s) expressed understanding.     - Discussed:     - Discussed with:  Patient      - Extended Intubation/Ventilatory Support Discussed: No.      - Patient " "is DNR/DNI Status: No     Use of blood products discussed: No .     Postoperative Care            Comments:               Nabil Anna MD    I have reviewed the pertinent notes and labs in the chart from the past 30 days and (re)examined the patient.  Any updates or changes from those notes are reflected in this note.    Clinically Significant Risk Factors Present on Admission                             # Overweight: Estimated body mass index is 26.27 kg/m  as calculated from the following:    Height as of 1/29/25: 1.683 m (5' 6.25\").    Weight as of 1/29/25: 74.4 kg (164 lb).                "

## 2025-02-18 NOTE — OR NURSING
Procedure: egd with bxs, colonoscopy with no interventions  Sedation: monitored anesthesia care  Specimens: verified, sent to lab.   O2: per anesthesia  Tolerated procedure: well  Pt to recovery area in stable condition accompanied by RN and crna, bedside report given to recovery RN  Other:  n/a    All belongings with patient at time of transfer.

## 2025-02-18 NOTE — ANESTHESIA POSTPROCEDURE EVALUATION
Patient: Marie Puente    Procedure: Procedure(s):  Colonoscopy  ESOPHAGOGASTRODUODENOSCOPY, WITH BIOPSY       Anesthesia Type:  MAC    Note:  Disposition: Outpatient   Postop Pain Control: Uneventful            Sign Out: Well controlled pain   PONV: No   Neuro/Psych: Uneventful            Sign Out: Acceptable/Baseline neuro status   Airway/Respiratory: Uneventful            Sign Out: Acceptable/Baseline resp. status   CV/Hemodynamics: Uneventful            Sign Out: Acceptable CV status; No obvious hypovolemia; No obvious fluid overload   Other NRE: NONE   DID A NON-ROUTINE EVENT OCCUR? No           Last vitals:  Vitals Value Taken Time   /82 02/18/25 1220   Temp     Pulse     Resp 16 02/18/25 1215   SpO2 99 % 02/18/25 1228   Vitals shown include unfiled device data.    Electronically Signed By: Nabil Anna MD  February 18, 2025  12:42 PM

## 2025-02-18 NOTE — H&P
Gastroenterology Pre-op History and Physical    Marie Puente MRN# [id number]   Age: 69 year old YOB: 1955      Date of Surgery: 02/18/25  [facility]      Date of Exam 2/18/2025 Facility Same Day       Primary care provider: Tiera Kwong         Chief Complaint and/or Reason for Procedure:   70 yo female. Recently seen for upper EUS and pancreatic biopsy or small NET. Since then underwent lap hyst/bso for what was initially thought to be a fibroid (from my recollection) with path showing this to be a 20.5 cm WD mucinous adenocarcinoma.    Referred for colonoscopy to assess for possible mucinous primary lesion elsewhere and recommended repeat EGD as well to ensure no gastric lesion, although diagnostic EGD at time of prior EUS was unremarkable.         Past Medical and Surgical History:     Past Medical History:   Diagnosis Date    Asthma     Bilateral sensorineural hearing loss     Diverticulosis     Enlarged uterus     History of colonic polyps     Hyperglycemia     Hyperlipidemia     Pancreatic head neuroendocrine tumor grade I     Uterine fibroid      Past Surgical History:   Procedure Laterality Date    BASAL CELL SKIN CA ON BACK SQUAMOUS CELL SKIN CA,-MOHS      X3    DAVINCI HYSTERECTOMY TOTAL, BILATERAL SALPINGO-OOPHORECTMY, NODE DISSECTION, TUMOR STAGING, COMBINED Bilateral 1/6/2025    Procedure: Robotic-assisted total laparoscopic hysterectomy, bilateral salpingo-oophorectomy, left ureterolysis, pelvic washings, omental biopsy;  Surgeon: Nora Chang MD;  Location:  OR    ENDOSCOPIC ULTRASOUND UPPER GASTROINTESTINAL TRACT (GI) N/A 12/05/2024    Procedure: ENDOSCOPIC ULTRASOUND, ESOPHAGOSCOPY / UPPER GASTROINTESTINAL TRACT (GI) with needle biopsy;  Surgeon: Daryl Morales MD;  Location: [facility] OR    EXPLORATORY LAP PELVIS              Medications (include herbals and vitamins):        Medications Prior to Admission   Medication Sig  Dispense Refill Last Dose/Taking    albuterol (PROAIR HFA/PROVENTIL HFA/VENTOLIN HFA) 108 (90 Base) MCG/ACT inhaler Inhale 2 puffs into the lungs every 6 hours as needed for shortness of breath, wheezing or cough. (Patient not taking: Reported on 1/29/2025)       fluticasone (FLONASE) 50 MCG/ACT nasal spray Spray into both nostrils. (Patient not taking: Reported on 1/29/2025)       fluticasone (FLONASE) 50 MCG/ACT nasal spray Spray 1 spray into both nostrils as needed.       Lutein 20 MG CAPS Take 20 mg by mouth daily.       multivitamin w/minerals (THERA-VIT-M) tablet Take 1 tablet by mouth daily.       Omega-3 Fatty Acids (FISH OIL) 1200 MG capsule Take 1,200 mg by mouth daily.       oxyCODONE (ROXICODONE) 5 MG tablet Take 1-2 tablets (5-10 mg) by mouth every 4 hours as needed for moderate to severe pain. (Patient not taking: Reported on 1/29/2025) 10 tablet 0     polyethylene glycol (MIRALAX) 17 GM/Dose powder Take 1 Capful by mouth daily.       rosuvastatin (CRESTOR) 40 MG tablet Take 40 mg by mouth daily.       senna-docusate (SENOKOT-S/PERICOLACE) 8.6-50 MG tablet Take 1-2 tablets by mouth 2 times daily as needed for constipation (While on oral opioids.). (Patient not taking: Reported on 1/29/2025) 30 tablet 0     vitamin C (ASCORBIC ACID) 1000 MG TABS Take 1,000 mg by mouth daily.       vitamin D3 (CHOLECALCIFEROL) 250 mcg (25327 units) capsule Take 1 capsule by mouth daily.                Allergies:      Allergies   Allergen Reactions    Mold Difficulty breathing and Shortness Of Breath    Dust Mites Difficulty breathing    Sulfa Antibiotics Hives and Swelling               Physical Exam:   All vitals have been reviewed  Patient Vitals for the past 8 hrs:   BP Resp SpO2   02/18/25 1023 126/89 16 98 %     No intake/output data recorded.  Airway assessment:   Patient is able to open mouth wide  Patient is able to stick out tongue  Mallampatti classification: Class I (visualization of the soft palate, fauces,  uvula, anterior and posterior pillars)}      Lungs:   No increased work of breathing, good air exchange, clear to auscultation bilaterally, no crackles or wheezing     Cardiovascular:   regular rate and rhythm and normal S1 and S2                 Anesthetic risk and/or ASA classification:    ASA2    Daryl Morales MD

## 2025-02-18 NOTE — ANESTHESIA CARE TRANSFER NOTE
Patient: Marie Puente    Procedure: Procedure(s):  Colonoscopy  ESOPHAGOGASTRODUODENOSCOPY, WITH BIOPSY       Diagnosis: Pancreatic mass [K86.89]  Neuroendocrine tumor of pancreas (H) [D3A.8]  Pelvic mass [R19.00]  Diagnosis Additional Information: No value filed.    Anesthesia Type:   MAC     Note:    Oropharynx: oropharynx clear of all foreign objects  Level of Consciousness: awake        Dentition: dentition unchanged  Vital Signs Stable: post-procedure vital signs reviewed and stable  Report to RN Given: handoff report given            Vitals:  Vitals Value Taken Time   /85 02/18/25 1150   Temp     Pulse     Resp 16 02/18/25 1150   SpO2 99 % 02/18/25 1154   Vitals shown include unfiled device data.    Electronically Signed By: RONNY Herndon CRNA  February 18, 2025  11:55 AM

## 2025-02-19 LAB
PATH REPORT.COMMENTS IMP SPEC: NORMAL
PATH REPORT.COMMENTS IMP SPEC: NORMAL
PATH REPORT.FINAL DX SPEC: NORMAL
PATH REPORT.GROSS SPEC: NORMAL
PATH REPORT.MICROSCOPIC SPEC OTHER STN: NORMAL
PATH REPORT.RELEVANT HX SPEC: NORMAL
PHOTO IMAGE: NORMAL

## 2025-03-04 ENCOUNTER — TRANSFERRED RECORDS (OUTPATIENT)
Dept: HEALTH INFORMATION MANAGEMENT | Facility: CLINIC | Age: 70
End: 2025-03-04
Payer: COMMERCIAL

## 2025-03-19 ENCOUNTER — TRANSFERRED RECORDS (OUTPATIENT)
Dept: HEALTH INFORMATION MANAGEMENT | Facility: CLINIC | Age: 70
End: 2025-03-19
Payer: COMMERCIAL

## 2025-03-22 ENCOUNTER — HEALTH MAINTENANCE LETTER (OUTPATIENT)
Age: 70
End: 2025-03-22

## 2025-03-26 ENCOUNTER — TRANSFERRED RECORDS (OUTPATIENT)
Dept: HEALTH INFORMATION MANAGEMENT | Facility: CLINIC | Age: 70
End: 2025-03-26
Payer: COMMERCIAL

## 2025-04-02 ENCOUNTER — TRANSFERRED RECORDS (OUTPATIENT)
Dept: HEALTH INFORMATION MANAGEMENT | Facility: CLINIC | Age: 70
End: 2025-04-02
Payer: COMMERCIAL

## 2025-04-16 ENCOUNTER — TRANSFERRED RECORDS (OUTPATIENT)
Dept: HEALTH INFORMATION MANAGEMENT | Facility: CLINIC | Age: 70
End: 2025-04-16
Payer: COMMERCIAL

## 2025-04-30 ENCOUNTER — TRANSFERRED RECORDS (OUTPATIENT)
Dept: HEALTH INFORMATION MANAGEMENT | Facility: CLINIC | Age: 70
End: 2025-04-30
Payer: COMMERCIAL

## 2025-05-14 ENCOUNTER — TRANSFERRED RECORDS (OUTPATIENT)
Dept: HEALTH INFORMATION MANAGEMENT | Facility: CLINIC | Age: 70
End: 2025-05-14
Payer: COMMERCIAL

## 2025-05-23 ENCOUNTER — TRANSFERRED RECORDS (OUTPATIENT)
Dept: HEALTH INFORMATION MANAGEMENT | Facility: CLINIC | Age: 70
End: 2025-05-23
Payer: COMMERCIAL

## 2025-06-11 ENCOUNTER — TRANSFERRED RECORDS (OUTPATIENT)
Dept: HEALTH INFORMATION MANAGEMENT | Facility: CLINIC | Age: 70
End: 2025-06-11
Payer: COMMERCIAL

## 2025-06-14 ENCOUNTER — HEALTH MAINTENANCE LETTER (OUTPATIENT)
Age: 70
End: 2025-06-14

## 2025-07-05 ENCOUNTER — HEALTH MAINTENANCE LETTER (OUTPATIENT)
Age: 70
End: 2025-07-05

## 2025-07-28 ENCOUNTER — HOSPITAL ENCOUNTER (OUTPATIENT)
Dept: MRI IMAGING | Facility: HOSPITAL | Age: 70
Discharge: HOME | End: 2025-07-28
Attending: INTERNAL MEDICINE | Admitting: INTERNAL MEDICINE
Payer: COMMERCIAL

## 2025-07-28 DIAGNOSIS — D3A.8 NEUROENDOCRINE TUMOR OF PANCREAS (H): ICD-10-CM

## 2025-07-28 PROCEDURE — A9585 GADOBUTROL INJECTION: HCPCS | Performed by: INTERNAL MEDICINE

## 2025-07-28 PROCEDURE — 74183 MRI ABD W/O CNTR FLWD CNTR: CPT

## 2025-07-28 PROCEDURE — 255N000002 HC RX 255 OP 636: Performed by: INTERNAL MEDICINE

## 2025-07-28 RX ORDER — GADOBUTROL 604.72 MG/ML
0.1 INJECTION INTRAVENOUS ONCE
Status: COMPLETED | OUTPATIENT
Start: 2025-07-28 | End: 2025-07-28

## 2025-07-28 RX ADMIN — GADOBUTROL 9 ML: 604.72 INJECTION INTRAVENOUS at 11:10

## 2025-07-30 ENCOUNTER — PATIENT OUTREACH (OUTPATIENT)
Dept: ONCOLOGY | Facility: HOSPITAL | Age: 70
End: 2025-07-30

## 2025-07-30 ENCOUNTER — LAB (OUTPATIENT)
Dept: INFUSION THERAPY | Facility: HOSPITAL | Age: 70
End: 2025-07-30
Attending: INTERNAL MEDICINE
Payer: COMMERCIAL

## 2025-07-30 ENCOUNTER — ONCOLOGY VISIT (OUTPATIENT)
Dept: ONCOLOGY | Facility: HOSPITAL | Age: 70
End: 2025-07-30
Attending: INTERNAL MEDICINE
Payer: COMMERCIAL

## 2025-07-30 VITALS
HEIGHT: 66 IN | TEMPERATURE: 97.6 F | BODY MASS INDEX: 27.26 KG/M2 | HEART RATE: 70 BPM | SYSTOLIC BLOOD PRESSURE: 118 MMHG | RESPIRATION RATE: 18 BRPM | DIASTOLIC BLOOD PRESSURE: 59 MMHG | OXYGEN SATURATION: 96 % | WEIGHT: 169.6 LBS

## 2025-07-30 DIAGNOSIS — D3A.8 NEUROENDOCRINE TUMOR OF PANCREAS (H): ICD-10-CM

## 2025-07-30 DIAGNOSIS — D3A.8 NEUROENDOCRINE TUMOR OF PANCREAS (H): Primary | ICD-10-CM

## 2025-07-30 DIAGNOSIS — C56.9 PRIMARY MUCINOUS ADENOCARCINOMA OF OVARY (H): ICD-10-CM

## 2025-07-30 DIAGNOSIS — D27.1: ICD-10-CM

## 2025-07-30 LAB
ALBUMIN SERPL BCG-MCNC: 4.3 G/DL (ref 3.5–5.2)
ALP SERPL-CCNC: 102 U/L (ref 40–150)
ALT SERPL W P-5'-P-CCNC: 23 U/L (ref 0–50)
ANION GAP SERPL CALCULATED.3IONS-SCNC: 8 MMOL/L (ref 7–15)
AST SERPL W P-5'-P-CCNC: 29 U/L (ref 0–45)
BASOPHILS # BLD AUTO: 0.1 10E3/UL (ref 0–0.2)
BASOPHILS NFR BLD AUTO: 1 %
BILIRUB SERPL-MCNC: 0.6 MG/DL
BUN SERPL-MCNC: 15.5 MG/DL (ref 8–23)
CALCIUM SERPL-MCNC: 9.6 MG/DL (ref 8.8–10.4)
CHLORIDE SERPL-SCNC: 100 MMOL/L (ref 98–107)
CREAT SERPL-MCNC: 0.87 MG/DL (ref 0.51–0.95)
EGFRCR SERPLBLD CKD-EPI 2021: 71 ML/MIN/1.73M2
EOSINOPHIL # BLD AUTO: 0.4 10E3/UL (ref 0–0.7)
EOSINOPHIL NFR BLD AUTO: 7 %
ERYTHROCYTE [DISTWIDTH] IN BLOOD BY AUTOMATED COUNT: 13.9 % (ref 10–15)
GLUCOSE SERPL-MCNC: 95 MG/DL (ref 70–99)
HCO3 SERPL-SCNC: 30 MMOL/L (ref 22–29)
HCT VFR BLD AUTO: 42.2 % (ref 35–47)
HGB BLD-MCNC: 13.8 G/DL (ref 11.7–15.7)
IMM GRANULOCYTES # BLD: 0 10E3/UL
IMM GRANULOCYTES NFR BLD: 1 %
LYMPHOCYTES # BLD AUTO: 1.5 10E3/UL (ref 0.8–5.3)
LYMPHOCYTES NFR BLD AUTO: 26 %
MCH RBC QN AUTO: 32.2 PG (ref 26.5–33)
MCHC RBC AUTO-ENTMCNC: 32.7 G/DL (ref 31.5–36.5)
MCV RBC AUTO: 98 FL (ref 78–100)
MONOCYTES # BLD AUTO: 0.6 10E3/UL (ref 0–1.3)
MONOCYTES NFR BLD AUTO: 11 %
NEUTROPHILS # BLD AUTO: 3.2 10E3/UL (ref 1.6–8.3)
NEUTROPHILS NFR BLD AUTO: 55 %
NRBC # BLD AUTO: 0 10E3/UL
NRBC BLD AUTO-RTO: 0 /100
PLATELET # BLD AUTO: 238 10E3/UL (ref 150–450)
POTASSIUM SERPL-SCNC: 4.1 MMOL/L (ref 3.4–5.3)
PROT SERPL-MCNC: 7.1 G/DL (ref 6.4–8.3)
RBC # BLD AUTO: 4.29 10E6/UL (ref 3.8–5.2)
SODIUM SERPL-SCNC: 138 MMOL/L (ref 135–145)
WBC # BLD AUTO: 5.8 10E3/UL (ref 4–11)

## 2025-07-30 PROCEDURE — 99214 OFFICE O/P EST MOD 30 MIN: CPT | Performed by: INTERNAL MEDICINE

## 2025-07-30 PROCEDURE — G2211 COMPLEX E/M VISIT ADD ON: HCPCS | Performed by: INTERNAL MEDICINE

## 2025-07-30 PROCEDURE — 85025 COMPLETE CBC W/AUTO DIFF WBC: CPT

## 2025-07-30 PROCEDURE — 80053 COMPREHEN METABOLIC PANEL: CPT

## 2025-07-30 PROCEDURE — 36415 COLL VENOUS BLD VENIPUNCTURE: CPT

## 2025-07-30 PROCEDURE — G0463 HOSPITAL OUTPT CLINIC VISIT: HCPCS | Performed by: INTERNAL MEDICINE

## 2025-07-30 ASSESSMENT — PAIN SCALES - GENERAL: PAINLEVEL_OUTOF10: NO PAIN (0)

## 2025-07-30 NOTE — PROGRESS NOTES
"Oncology Rooming Note    July 30, 2025 9:41 AM   Marie Puente is a 70 year old female who presents for:    Chief Complaint   Patient presents with    Oncology Clinic Visit     Neuroendocrine tumor of pancreas (H)     Initial Vitals: /59   Pulse 70   Temp 97.6  F (36.4  C)   Resp 18   Ht 1.683 m (5' 6.25\")   Wt 76.9 kg (169 lb 9.6 oz)   SpO2 96%   BMI 27.17 kg/m   Estimated body mass index is 27.17 kg/m  as calculated from the following:    Height as of this encounter: 1.683 m (5' 6.25\").    Weight as of this encounter: 76.9 kg (169 lb 9.6 oz). Body surface area is 1.9 meters squared.  No Pain (0) Comment: Data Unavailable   No LMP recorded. Patient has had a hysterectomy.  Allergies reviewed: Yes  Medications reviewed: Yes    Medications: Medication refills not needed today.  Pharmacy name entered into [facility]: [facility] [address] [place] [address]    PHQ9:  Did this patient require a PHQ9?: No      Clinical concerns: None      Marleni Liu LPN             "

## 2025-07-30 NOTE — PROGRESS NOTES
[facility] Hematology and Oncology Progress Note    Patient: Marie Puente  MRN: [id number]  7/30/25        Reason for Visit    Chief Complaint   Patient presents with    Oncology Clinic Visit     Neuroendocrine tumor of pancreas (H)         Problem List Items Addressed This Visit       Neuroendocrine tumor of pancreas (H) - Primary    Relevant Orders    MR Abdomen w/o & w Contrast    CBC with Platelets & Differential    COMPREHENSIVE METABOLIC PANEL     Other Visit Diagnoses         Mucinous adenofibroma of left ovary                  Assessment and Plan  Well-differentiated neuroendocrine tumor of the pancreas  Mucinous adenocarcinoma of the ovary, 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  After I saw her previously she underwent adjuvant chemotherapy for the mucinous adenocarcinoma of the ovary with 6 cycles of FOLFOX.  Looks like this was done in consultation with the [facility].  There was some concern for rupture of the tumor and spillage of mucin into the peritoneal cavity.  Follows with [place] [facility] for this.  She has an upcoming PET scan in September.  She is here with an MRI of the abdomen for surveillance for her NET.  No particular symptoms reported from that regard.  Also had labs done today.    Results  - Colonoscopy: No adenomas or polyps found.  - MRI: Neuroendocrine tumor in the pancreas is stable, less than 2 cm.  - Blood counts: Normal.  - Chemistry: Normal.  - Carbon dioxide levels: High, indicating slight metabolic alkalosis.  - I reviewed lab results personally and independently interpreted the results.  - I reviewed /MRI images and report personally and independently interpreted the results.   - I reviewed the documentation, lab studies and studies ordered by the outside providers personally and independently interpreted results.    Plan  Neuroendocrine tumor of pancreas:  The neuroendocrine tumor is  well-differentiated and stable, measuring less than 2 cm. The MRI showed no change, and the likelihood of metastasis is low. No additional treatment is required at this time. Current clinical trials support the use of surveillance for well-differentiated, stable neuroendocrine tumors, as these tumors tend to have a favorable prognosis and low risk of progression. Prognosis remains favorable given the stability and differentiation of the tumor.  Continue with MRI surveillance of the abdomen. A PET dotatate scan is not necessary unless there is concern about tumor growth or treatment is considered. Follow-up in 6 months to reassess and potentially adjust the surveillance plan.    Mucinous adenofibroma of left ovary:  The patient underwent chemotherapy due to concerns about rupture and spillage. Surveillance is ongoing with CT/PET scans.   Patient is on oncology for this.  It looks like an upcoming PET scan has been scheduled in September.  Going forward we will minimize the number of scans for her.  So I will probably focus on the NET only and do MRI of the abdomen every 6 to 12 months.  I think she will be getting pretty frequent surveillance CT scans from the mucinous adenocarcinoma standpoint.     Agreeable to the plan.    Cancer Staging   No matching staging information was found for the patient.      ECOG Performance    0 - Independent         Problem List    Patient Active Problem List   Diagnosis    Impingement syndrome of shoulder region, right    Neuroendocrine tumor of pancreas (H)    Pelvic mass        Oncology history  She presented to her PCP in October 2024 with sinusitis type symptoms.  At the time she had mentioned some abdominal fullness.  She had felt a mass in that area.  CT abdomen and pelvis Done on October 31, 2024 showed a large heterogeneous subserosal fibroid measuring 12.2 x 9.2 x 1.5 cm extending to the ventral abdomen/pelvis.  She also had an incidentally found 1.0 x 1.2 cm enhancing lesion  in the pancreatic tail.  Following this she had a dotatate PET scan on 11/15/2024 which showed pancreatic tail a soft tissue nodule with intense radiotracer uptake consistent with neuroendocrine tumor.  No evidence of any disease elsewhere.  She also had large mass in the pelvis as noted in the previous CT scan.  She underwent endoscopic ultrasound-guided biopsy of the pancreatic tail mass which came back showing well-differentiated neuroendocrine tumor with a Ki-67 index of 3%.  She was seen at the [facility] surgery for the pancreatic neuroendocrine tumor and since the tumor was measuring less than 2 cm, observation was recommended.  From a pelvic mass she was seen by gynecologic oncology who performed robotic assisted total laparoscopic hysterectomy, bilateral salpingo-oophorectomy along with left ureterolysis, pelvic washings and omental biopsy.  Surgical path from that surgery came back showing mucinous adenocarcinoma involving left ovary measuring approximately 20.5 cm.  It appeared well-differentiated and there was background of mucinous borderline tumor with foci of endometrial carcinoma.  Final pathologic T staging was T1a.  FIGO stage Ia.  Lymph nodes were not removed.  Right ovary was negative for any malignancy.  Peritoneal washings were negative for malignancy.  Peritoneal biopsy was negative for malignancy     Interval History   Marie Puente is a 70 year old female with history of mucinous adenocarcinoma of the left ovary with possible rupture status post surgery and adjuvant chemotherapy with 6 cycles of FOLFOX, history of well-differentiated neuroendocrine tumor of the pancreas currently on observation who is here for follow-up with repeat MRI.    Previously I saw her for the well-differentiated pancreatic tail NET.  The tumor was measuring less than 2 cm so recommendation was to do observation only.  Just prior to that she was also diagnosed with mucinous adenocarcinoma involving  left ovary.  The tumor was measuring 20.5 cm.  Well-differentiated with both background mucinous borderline tumor with foci of endometrial carcinoma.  Pathologic staging was T1a.  Looks like the lymph nodes were not removed at the time.  Washings were negative for malignancy.  She underwent upper and lower GI endoscopy looking for a primary tumor which was negative.  She met with gynecological oncology at [place] [facility] and recommendation was to consider adjuvant systemic therapy to prevent local recurrence.  There was concern for tumor rupture.  She underwent 6 cycles of FOLFOX.  Last dose in June 2025.  Did well with the chemotherapy.  She is here with repeat MRI for surveillance of her NET.  Denies any new issues today.  Some sweating episodes but not consistent.  No significant night sweats or flushing.        Review of Systems  A comprehensive review of systems was negative except for what is noted in the interval history    Current Outpatient Medications   Medication Sig Dispense Refill    albuterol (PROAIR HFA/PROVENTIL HFA/VENTOLIN HFA) 108 (90 Base) MCG/ACT inhaler Inhale 2 puffs into the lungs every 6 hours as needed for shortness of breath, wheezing or cough.      fluticasone (FLONASE) 50 MCG/ACT nasal spray Spray into both nostrils. (Patient taking differently: Spray into both nostrils as needed.)      fluticasone (FLONASE) 50 MCG/ACT nasal spray Spray 1 spray into both nostrils as needed.      Lutein 20 MG CAPS Take 20 mg by mouth daily.      multivitamin w/minerals (THERA-VIT-M) tablet Take 1 tablet by mouth daily.      Omega-3 Fatty Acids (FISH OIL) 1200 MG capsule Take 1,200 mg by mouth daily.      polyethylene glycol (MIRALAX) 17 GM/Dose powder Take 1 Capful by mouth daily.      rosuvastatin (CRESTOR) 40 MG tablet Take 40 mg by mouth daily.      vitamin C (ASCORBIC ACID) 1000 MG TABS Take 1,000 mg by mouth daily.      vitamin D3 (CHOLECALCIFEROL) 250 mcg (43650 units) capsule Take 1 capsule by  mouth daily.      oxyCODONE (ROXICODONE) 5 MG tablet Take 1-2 tablets (5-10 mg) by mouth every 4 hours as needed for moderate to severe pain. (Patient not taking: Reported on 7/30/2025) 10 tablet 0    senna-docusate (SENOKOT-S/PERICOLACE) 8.6-50 MG tablet Take 1-2 tablets by mouth 2 times daily as needed for constipation (While on oral opioids.). (Patient not taking: Reported on 7/30/2025) 30 tablet 0     No current facility-administered medications for this visit.        Physical Exam    Failed to redirect to the Timeline version of the [facility] SmartLink.    General: alert and cooperative  HEENT: Head: Normal, normocephalic, atraumatic.  Eye: Normal external eye, conjunctiva, lids cornea, DOUGLAS.  Extremities: atraumatic, no peripheral edema  CNS: Alert and oriented x3, neurologic exam grossly normal.      Lab Results    Recent Results (from the past week)   Comprehensive metabolic panel (BMP + Alb, Alk Phos, ALT, AST, Total. Bili, TP)   Result Value Ref Range    Sodium 138 135 - 145 mmol/L    Potassium 4.1 3.4 - 5.3 mmol/L    Carbon Dioxide (CO2) 30 (H) 22 - 29 mmol/L    Anion Gap 8 7 - 15 mmol/L    Urea Nitrogen 15.5 8.0 - 23.0 mg/dL    Creatinine 0.87 0.51 - 0.95 mg/dL    GFR Estimate 71 >60 mL/min/1.73m2    Calcium 9.6 8.8 - 10.4 mg/dL    Chloride 100 98 - 107 mmol/L    Glucose 95 70 - 99 mg/dL    Alkaline Phosphatase 102 40 - 150 U/L    AST 29 0 - 45 U/L    ALT 23 0 - 50 U/L    Protein Total 7.1 6.4 - 8.3 g/dL    Albumin 4.3 3.5 - 5.2 g/dL    Bilirubin Total 0.6 <=1.2 mg/dL   CBC with platelets and differential   Result Value Ref Range    WBC Count 5.8 4.0 - 11.0 10e3/uL    RBC Count 4.29 3.80 - 5.20 10e6/uL    Hemoglobin 13.8 11.7 - 15.7 g/dL    Hematocrit 42.2 35.0 - 47.0 %    MCV 98 78 - 100 fL    MCH 32.2 26.5 - 33.0 pg    MCHC 32.7 31.5 - 36.5 g/dL    RDW 13.9 10.0 - 15.0 %    Platelet Count 238 150 - 450 10e3/uL    % Neutrophils 55 %    % Lymphocytes 26 %    % Monocytes 11 %    % Eosinophils 7 %    %  Basophils 1 %    % Immature Granulocytes 1 %    NRBCs per 100 WBC 0 <1 /100    Absolute Neutrophils 3.2 1.6 - 8.3 10e3/uL    Absolute Lymphocytes 1.5 0.8 - 5.3 10e3/uL    Absolute Monocytes 0.6 0.0 - 1.3 10e3/uL    Absolute Eosinophils 0.4 0.0 - 0.7 10e3/uL    Absolute Basophils 0.1 0.0 - 0.2 10e3/uL    Absolute Immature Granulocytes 0.0 <=0.4 10e3/uL    Absolute NRBCs 0.0 10e3/uL       Imaging    MR Abdomen w/o & w Contrast  Result Date: 7/29/2025  EXAM: MR ABDOMEN W/O and W CONTRAST LOCATION: [facility] DATE: 7/28/2025 INDICATION:  Neuroendocrine tumor of pancreas (H) COMPARISON: CT abdomen and pelvis from 10/31/2024, PET dotatate scan from 11/15/2024 TECHNIQUE: Routine MRI abdomen protocol including T1 in/out phase, diffusion, multiplane T2, and dynamic T1 without and with IV contrast. CONTRAST: Gadavist 8mL FINDINGS: HEPATOBILIARY: Mild hepatic steatosis. No convincing suspicious hepatic lesions within the limits of this exam. PANCREAS: Again seen is the arterially hyperenhancing lesion in the tail of the pancreas. This measures approximately 1.1 x 1.0 cm, relatively similar to prior exam when allowing for differences in modality (series 1301 image 23). No pancreatic ductal dilation. SPLEEN: Normal. ADRENAL GLANDS: Normal. KIDNEYS: No suspicious mass or hydronephrosis. ADDITIONAL FINDINGS: No ascites or suspicious adenopathy.     IMPRESSION: 1.  Similar small hyperenhancing lesion in the tail of the pancreas compatible with known neuroendocrine tumor. 2.  No evidence of metastatic disease in the field-of-view.    The longitudinal plan of care for the diagnosis(es)/condition(s) as documented were addressed during this visit. Due to the added complexity in care, I will continue to support Guillermina in the subsequent management and with ongoing continuity of care.      Signed by: Gisella Ledesma MD

## 2025-07-30 NOTE — LETTER
"7/30/2025      Marie Puente  [address]   [place] [address]      Dear Colleague,    Thank you for referring your patient, Marie Puente, to the [facility] [place]. Please see a copy of my visit note below.    Oncology Rooming Note    July 30, 2025 9:41 AM   Marie Puente is a 70 year old female who presents for:    Chief Complaint   Patient presents with     Oncology Clinic Visit     Neuroendocrine tumor of pancreas (H)     Initial Vitals: /59   Pulse 70   Temp 97.6  F (36.4  C)   Resp 18   Ht 1.683 m (5' 6.25\")   Wt 76.9 kg (169 lb 9.6 oz)   SpO2 96%   BMI 27.17 kg/m   Estimated body mass index is 27.17 kg/m  as calculated from the following:    Height as of this encounter: 1.683 m (5' 6.25\").    Weight as of this encounter: 76.9 kg (169 lb 9.6 oz). Body surface area is 1.9 meters squared.  No Pain (0) Comment: Data Unavailable   No LMP recorded. Patient has had a hysterectomy.  Allergies reviewed: Yes  Medications reviewed: Yes    Medications: Medication refills not needed today.  Pharmacy name entered into [facility]: [facility] [address] [place] [address]    PHQ9:  Did this patient require a PHQ9?: No      Clinical concerns: None      Marleni Liu LPN               [facility] Hematology and Oncology Progress Note    Patient: Marie Puente  MRN: [id number]  7/30/25        Reason for Visit    Chief Complaint   Patient presents with     Oncology Clinic Visit     Neuroendocrine tumor of pancreas (H)         Problem List Items Addressed This Visit       Neuroendocrine tumor of pancreas (H) - Primary    Relevant Orders    MR Abdomen w/o & w Contrast    CBC with Platelets & Differential    COMPREHENSIVE METABOLIC PANEL     Other Visit Diagnoses         Mucinous adenofibroma of left ovary                  Assessment and Plan  Well-differentiated neuroendocrine tumor of the pancreas  Mucinous adenocarcinoma of the ovary, status " post debulking surgery and adjuvant chemotherapy with FOLFOX for 6 cycles    Here with repeat MRI.  After I saw her previously she underwent adjuvant chemotherapy for the mucinous adenocarcinoma of the ovary with 6 cycles of FOLFOX.  Looks like this was done in consultation with the [facility].  There was some concern for rupture of the tumor and spillage of mucin into the peritoneal cavity.  Follows with [place] [facility] for this.  She has an upcoming PET scan in September.  She is here with an MRI of the abdomen for surveillance for her NET.  No particular symptoms reported from that regard.  Also had labs done today.    Results  - Colonoscopy: No adenomas or polyps found.  - MRI: Neuroendocrine tumor in the pancreas is stable, less than 2 cm.  - Blood counts: Normal.  - Chemistry: Normal.  - Carbon dioxide levels: High, indicating slight metabolic alkalosis.  - I reviewed lab results personally and independently interpreted the results.  - I reviewed /MRI images and report personally and independently interpreted the results.   - I reviewed the documentation, lab studies and studies ordered by the outside providers personally and independently interpreted results.    Plan  Neuroendocrine tumor of pancreas:  The neuroendocrine tumor is well-differentiated and stable, measuring less than 2 cm. The MRI showed no change, and the likelihood of metastasis is low. No additional treatment is required at this time. Current clinical trials support the use of surveillance for well-differentiated, stable neuroendocrine tumors, as these tumors tend to have a favorable prognosis and low risk of progression. Prognosis remains favorable given the stability and differentiation of the tumor.  Continue with MRI surveillance of the abdomen. A PET dotatate scan is not necessary unless there is concern about tumor growth or treatment is considered. Follow-up in 6 months to reassess and potentially adjust the surveillance  plan.    Mucinous adenofibroma of left ovary:  The patient underwent chemotherapy due to concerns about rupture and spillage. Surveillance is ongoing with CT/PET scans.   Patient is on oncology for this.  It looks like an upcoming PET scan has been scheduled in September.  Going forward we will minimize the number of scans for her.  So I will probably focus on the NET only and do MRI of the abdomen every 6 to 12 months.  I think she will be getting pretty frequent surveillance CT scans from the mucinous adenocarcinoma standpoint.     Agreeable to the plan.    Cancer Staging   No matching staging information was found for the patient.      ECOG Performance    0 - Independent         Problem List    Patient Active Problem List   Diagnosis     Impingement syndrome of shoulder region, right     Neuroendocrine tumor of pancreas (H)     Pelvic mass        Oncology history  She presented to her PCP in October 2024 with sinusitis type symptoms.  At the time she had mentioned some abdominal fullness.  She had felt a mass in that area.  CT abdomen and pelvis Done on October 31, 2024 showed a large heterogeneous subserosal fibroid measuring 12.2 x 9.2 x 1.5 cm extending to the ventral abdomen/pelvis.  She also had an incidentally found 1.0 x 1.2 cm enhancing lesion in the pancreatic tail.  Following this she had a dotatate PET scan on 11/15/2024 which showed pancreatic tail a soft tissue nodule with intense radiotracer uptake consistent with neuroendocrine tumor.  No evidence of any disease elsewhere.  She also had large mass in the pelvis as noted in the previous CT scan.  She underwent endoscopic ultrasound-guided biopsy of the pancreatic tail mass which came back showing well-differentiated neuroendocrine tumor with a Ki-67 index of 3%.  She was seen at the [facility] surgery for the pancreatic neuroendocrine tumor and since the tumor was measuring less than 2 cm, observation was recommended.  From a pelvic  mass she was seen by gynecologic oncology who performed robotic assisted total laparoscopic hysterectomy, bilateral salpingo-oophorectomy along with left ureterolysis, pelvic washings and omental biopsy.  Surgical path from that surgery came back showing mucinous adenocarcinoma involving left ovary measuring approximately 20.5 cm.  It appeared well-differentiated and there was background of mucinous borderline tumor with foci of endometrial carcinoma.  Final pathologic T staging was T1a.  FIGO stage Ia.  Lymph nodes were not removed.  Right ovary was negative for any malignancy.  Peritoneal washings were negative for malignancy.  Peritoneal biopsy was negative for malignancy     Interval History   Marie Puente is a 70 year old female with history of mucinous adenocarcinoma of the left ovary with possible rupture status post surgery and adjuvant chemotherapy with 6 cycles of FOLFOX, history of well-differentiated neuroendocrine tumor of the pancreas currently on observation who is here for follow-up with repeat MRI.    Previously I saw her for the well-differentiated pancreatic tail NET.  The tumor was measuring less than 2 cm so recommendation was to do observation only.  Just prior to that she was also diagnosed with mucinous adenocarcinoma involving left ovary.  The tumor was measuring 20.5 cm.  Well-differentiated with both background mucinous borderline tumor with foci of endometrial carcinoma.  Pathologic staging was T1a.  Looks like the lymph nodes were not removed at the time.  Washings were negative for malignancy.  She underwent upper and lower GI endoscopy looking for a primary tumor which was negative.  She met with gynecological oncology at [place] [facility] and recommendation was to consider adjuvant systemic therapy to prevent local recurrence.  There was concern for tumor rupture.  She underwent 6 cycles of FOLFOX.  Last dose in June 2025.  Did well with the chemotherapy.  She is here with  repeat MRI for surveillance of her NET.  Denies any new issues today.  Some sweating episodes but not consistent.  No significant night sweats or flushing.        Review of Systems  A comprehensive review of systems was negative except for what is noted in the interval history    Current Outpatient Medications   Medication Sig Dispense Refill     albuterol (PROAIR HFA/PROVENTIL HFA/VENTOLIN HFA) 108 (90 Base) MCG/ACT inhaler Inhale 2 puffs into the lungs every 6 hours as needed for shortness of breath, wheezing or cough.       fluticasone (FLONASE) 50 MCG/ACT nasal spray Spray into both nostrils. (Patient taking differently: Spray into both nostrils as needed.)       fluticasone (FLONASE) 50 MCG/ACT nasal spray Spray 1 spray into both nostrils as needed.       Lutein 20 MG CAPS Take 20 mg by mouth daily.       multivitamin w/minerals (THERA-VIT-M) tablet Take 1 tablet by mouth daily.       Omega-3 Fatty Acids (FISH OIL) 1200 MG capsule Take 1,200 mg by mouth daily.       polyethylene glycol (MIRALAX) 17 GM/Dose powder Take 1 Capful by mouth daily.       rosuvastatin (CRESTOR) 40 MG tablet Take 40 mg by mouth daily.       vitamin C (ASCORBIC ACID) 1000 MG TABS Take 1,000 mg by mouth daily.       vitamin D3 (CHOLECALCIFEROL) 250 mcg (37106 units) capsule Take 1 capsule by mouth daily.       oxyCODONE (ROXICODONE) 5 MG tablet Take 1-2 tablets (5-10 mg) by mouth every 4 hours as needed for moderate to severe pain. (Patient not taking: Reported on 7/30/2025) 10 tablet 0     senna-docusate (SENOKOT-S/PERICOLACE) 8.6-50 MG tablet Take 1-2 tablets by mouth 2 times daily as needed for constipation (While on oral opioids.). (Patient not taking: Reported on 7/30/2025) 30 tablet 0     No current facility-administered medications for this visit.        Physical Exam    Failed to redirect to the Timeline version of the [facility] SmartLink.    General: alert and cooperative  HEENT: Head: Normal, normocephalic, atraumatic.  Eye: Normal  external eye, conjunctiva, lids cornea, DOUGLAS.  Extremities: atraumatic, no peripheral edema  CNS: Alert and oriented x3, neurologic exam grossly normal.      Lab Results    Recent Results (from the past week)   Comprehensive metabolic panel (BMP + Alb, Alk Phos, ALT, AST, Total. Bili, TP)   Result Value Ref Range    Sodium 138 135 - 145 mmol/L    Potassium 4.1 3.4 - 5.3 mmol/L    Carbon Dioxide (CO2) 30 (H) 22 - 29 mmol/L    Anion Gap 8 7 - 15 mmol/L    Urea Nitrogen 15.5 8.0 - 23.0 mg/dL    Creatinine 0.87 0.51 - 0.95 mg/dL    GFR Estimate 71 >60 mL/min/1.73m2    Calcium 9.6 8.8 - 10.4 mg/dL    Chloride 100 98 - 107 mmol/L    Glucose 95 70 - 99 mg/dL    Alkaline Phosphatase 102 40 - 150 U/L    AST 29 0 - 45 U/L    ALT 23 0 - 50 U/L    Protein Total 7.1 6.4 - 8.3 g/dL    Albumin 4.3 3.5 - 5.2 g/dL    Bilirubin Total 0.6 <=1.2 mg/dL   CBC with platelets and differential   Result Value Ref Range    WBC Count 5.8 4.0 - 11.0 10e3/uL    RBC Count 4.29 3.80 - 5.20 10e6/uL    Hemoglobin 13.8 11.7 - 15.7 g/dL    Hematocrit 42.2 35.0 - 47.0 %    MCV 98 78 - 100 fL    MCH 32.2 26.5 - 33.0 pg    MCHC 32.7 31.5 - 36.5 g/dL    RDW 13.9 10.0 - 15.0 %    Platelet Count 238 150 - 450 10e3/uL    % Neutrophils 55 %    % Lymphocytes 26 %    % Monocytes 11 %    % Eosinophils 7 %    % Basophils 1 %    % Immature Granulocytes 1 %    NRBCs per 100 WBC 0 <1 /100    Absolute Neutrophils 3.2 1.6 - 8.3 10e3/uL    Absolute Lymphocytes 1.5 0.8 - 5.3 10e3/uL    Absolute Monocytes 0.6 0.0 - 1.3 10e3/uL    Absolute Eosinophils 0.4 0.0 - 0.7 10e3/uL    Absolute Basophils 0.1 0.0 - 0.2 10e3/uL    Absolute Immature Granulocytes 0.0 <=0.4 10e3/uL    Absolute NRBCs 0.0 10e3/uL       Imaging    MR Abdomen w/o & w Contrast  Result Date: 7/29/2025  EXAM: MR ABDOMEN W/O and W CONTRAST LOCATION: [facility] DATE: 7/28/2025 INDICATION:  Neuroendocrine tumor of pancreas (H) COMPARISON: CT abdomen and pelvis from 10/31/2024, PET  dotatate scan from 11/15/2024 TECHNIQUE: Routine MRI abdomen protocol including T1 in/out phase, diffusion, multiplane T2, and dynamic T1 without and with IV contrast. CONTRAST: Gadavist 8mL FINDINGS: HEPATOBILIARY: Mild hepatic steatosis. No convincing suspicious hepatic lesions within the limits of this exam. PANCREAS: Again seen is the arterially hyperenhancing lesion in the tail of the pancreas. This measures approximately 1.1 x 1.0 cm, relatively similar to prior exam when allowing for differences in modality (series 1301 image 23). No pancreatic ductal dilation. SPLEEN: Normal. ADRENAL GLANDS: Normal. KIDNEYS: No suspicious mass or hydronephrosis. ADDITIONAL FINDINGS: No ascites or suspicious adenopathy.     IMPRESSION: 1.  Similar small hyperenhancing lesion in the tail of the pancreas compatible with known neuroendocrine tumor. 2.  No evidence of metastatic disease in the field-of-view.    The longitudinal plan of care for the diagnosis(es)/condition(s) as documented were addressed during this visit. Due to the added complexity in care, I will continue to support Guillermina in the subsequent management and with ongoing continuity of care.      Signed by: Gisella Ledesma MD      Again, thank you for allowing me to participate in the care of your patient.        Sincerely,        Gisella Ledesma MD    Electronically signed

## 2025-07-30 NOTE — PROGRESS NOTES
Situation: Patient chart reviewed by care coordinator.    Background: Well-differentiated neuroendocrine tumor of the pancreas    (Mucinous adenocarcinoma of the ovary, status post debulking surgery and adjuvant chemotherapy with FOLFOX for 6 cycles -- followed by [facility] for this)      Assessment: Here for repeat MRI of abdomen for surveillance of NET, and labs today -- all stable. No new findings    Plan/Recommendations: Follow up in 6 months to reassess and adjust surveillance plan if needed.    Juli Hampton RN       Called and left message to [facility] to give report on patient.

## 2025-08-16 ENCOUNTER — HEALTH MAINTENANCE LETTER (OUTPATIENT)
Age: 70
End: 2025-08-16

## 2025-09-03 ENCOUNTER — HOSPITAL ENCOUNTER (OUTPATIENT)
Dept: PET IMAGING | Facility: CLINIC | Age: 70
Discharge: HOME | End: 2025-09-03
Attending: NURSE PRACTITIONER
Payer: COMMERCIAL

## 2025-09-03 DIAGNOSIS — C56.2 OVARIAN CANCER ON LEFT (H): ICD-10-CM

## 2025-09-03 DIAGNOSIS — R19.00 PELVIC MASS: ICD-10-CM

## 2025-09-03 LAB — GLUCOSE BLDC GLUCOMTR-MCNC: 111 MG/DL (ref 70–99)

## 2025-09-03 PROCEDURE — 343N000001 HC RX 343 MED OP 636: Performed by: NURSE PRACTITIONER

## 2025-09-03 PROCEDURE — 78815 PET IMAGE W/CT SKULL-THIGH: CPT | Mod: PI

## 2025-09-03 PROCEDURE — 82962 GLUCOSE BLOOD TEST: CPT

## 2025-09-03 PROCEDURE — A9552 F18 FDG: HCPCS | Performed by: NURSE PRACTITIONER

## 2025-09-03 RX ORDER — HEPARIN SODIUM (PORCINE) LOCK FLUSH IV SOLN 100 UNIT/ML 100 UNIT/ML
500 SOLUTION INTRAVENOUS ONCE
Status: COMPLETED | OUTPATIENT
Start: 2025-09-03 | End: 2025-09-03

## 2025-09-03 RX ORDER — FLUDEOXYGLUCOSE F 18 200 MCI/ML
10-18 INJECTION, SOLUTION INTRAVENOUS ONCE
Status: COMPLETED | OUTPATIENT
Start: 2025-09-03 | End: 2025-09-03

## 2025-09-03 RX ADMIN — HEPARIN SODIUM (PORCINE) LOCK FLUSH IV SOLN 100 UNIT/ML 500 UNITS: 100 SOLUTION at 09:32

## 2025-09-03 RX ADMIN — FLUDEOXYGLUCOSE F 18 10.22 MILLICURIE: 200 INJECTION, SOLUTION INTRAVENOUS at 09:30

## (undated) DEVICE — LINEN TOWEL PACK X5 5464

## (undated) DEVICE — GLOVE BIOGEL PI MICRO INDICATOR UNDERGLOVE SZ 6.5 48965

## (undated) DEVICE — ENDO TUBING CO2 SMARTCAP STERILE DISP 100145CO2EXT

## (undated) DEVICE — DAVINCI XI GRASPER ENDOWRIST PROGRASP 470093

## (undated) DEVICE — SUCTION IRR STRYKERFLOW II W/TIP 250-070-520

## (undated) DEVICE — Device

## (undated) DEVICE — SYR 10ML FINGER CONTROL W/O NDL 309695

## (undated) DEVICE — NDL INSUFFLATION 13GA 120MM C2201

## (undated) DEVICE — SU VICRYL 2-0 CT-1 27" J339H

## (undated) DEVICE — GLOVE PROTEXIS BLUE W/NEU-THERA 6.5  2D73EB65

## (undated) DEVICE — DRAPE CV SPLIT II 147X106" 9158

## (undated) DEVICE — DAVINCI XI TISSUE SEALER SYNCROSEAL 8MM 480440

## (undated) DEVICE — SURGICEL POWDER ABSORBABLE HEMOSTAT 3GM 3013SP

## (undated) DEVICE — SUCTION MANIFOLD NEPTUNE 2 SYS 4 PORT 0702-020-000

## (undated) DEVICE — BITE BLOCK ENDO W/DENTAL RIM 54FR SBT-114-100

## (undated) DEVICE — DRAPE LEGGINGS 8421

## (undated) DEVICE — APPLICATOR ENDOSCOPIC 5 SURGICEL POWDER 3123SPEA

## (undated) DEVICE — PACK ENDOSCOPY GI CUSTOM UMMC

## (undated) DEVICE — GLOVE BIOGEL PI MICRO SZ 6.5 48565

## (undated) DEVICE — DAVINCI XI DRAPE ARM 470015

## (undated) DEVICE — ESU GROUND PAD UNIVERSAL W/O CORD

## (undated) DEVICE — DRAPE MAYO STAND 23X54 8337

## (undated) DEVICE — PACK DAVINCI GYN SMA15GDFS1

## (undated) DEVICE — SOL WATER IRRIG 1000ML BOTTLE 2F7114

## (undated) DEVICE — DAVINCI XI MONOPOLAR SCISSORS HOT SHEARS 8MM 470179

## (undated) DEVICE — SU MONOCRYL 4-0 PS-2 18" UND Y496G

## (undated) DEVICE — DAVINCI XI DRAPE COLUMN 470341

## (undated) DEVICE — ANTIFOG SOLUTION SEE SHARP 150M TROCAR SWABS 30978 (COI)

## (undated) DEVICE — ENDO NDL BALL TIP ULTRASOUND 22GA 5.2FR ECHO-3-22

## (undated) DEVICE — TUBING CONMED AIRSEAL SMOKE EVAC INSUFFLATION ASM-EVAC

## (undated) DEVICE — ENDO TROCAR CONMED AIRSEAL BLADELESS 08X120MM IAS8-120LP

## (undated) DEVICE — SOL NACL 0.9% INJ 1000ML BAG 2B1324X

## (undated) DEVICE — DAVINCI XI OBTURATOR BLADELESS 8MM 470359

## (undated) DEVICE — POUCH TISSUE RETRIEVAL 15MM 6.00" INTRO TRS190SB2

## (undated) DEVICE — DAVINCI HOT SHEARS TIP COVER  400180

## (undated) DEVICE — SPONGE LAP 18X18" X8435

## (undated) DEVICE — CLEANER INST PRE-KLENZ SOAK SHIELD TUBE 6 ML MEDIUM 2D66J4

## (undated) DEVICE — DRAPE IOBAN INCISE 23X17" 6650EZ

## (undated) DEVICE — SU DERMABOND MINI DHVM12

## (undated) DEVICE — KIT ENDO FIRST STEP DISINFECTANT 200ML W/POUCH EP-4

## (undated) DEVICE — SOL NACL 0.9% IRRIG 1000ML BOTTLE 2F7124

## (undated) DEVICE — DAVINCI XI SEAL UNIVERSAL 5-12MM 470500

## (undated) DEVICE — SU WND CLOSURE VLOC 180 ABS 0 12" GS-21 VLOCL0316

## (undated) DEVICE — DAVINCI XI NDL DRIVER LARGE 470006

## (undated) DEVICE — BNDG ABDOMINAL BINDER 9X45-62" 79-89071

## (undated) DEVICE — CATH TRAY FOLEY SURESTEP 16FR WDRAIN BAG STLK LATEX A300316A

## (undated) RX ORDER — HYDROMORPHONE HYDROCHLORIDE 1 MG/ML
INJECTION, SOLUTION INTRAMUSCULAR; INTRAVENOUS; SUBCUTANEOUS
Status: DISPENSED
Start: 2025-01-06

## (undated) RX ORDER — HEPARIN SODIUM 5000 [USP'U]/.5ML
INJECTION, SOLUTION INTRAVENOUS; SUBCUTANEOUS
Status: DISPENSED
Start: 2025-01-06

## (undated) RX ORDER — BUPIVACAINE HYDROCHLORIDE 5 MG/ML
INJECTION, SOLUTION EPIDURAL; INTRACAUDAL
Status: DISPENSED
Start: 2025-01-06

## (undated) RX ORDER — FENTANYL CITRATE 0.05 MG/ML
INJECTION, SOLUTION INTRAMUSCULAR; INTRAVENOUS
Status: DISPENSED
Start: 2025-01-06

## (undated) RX ORDER — SIMETHICONE 40MG/0.6ML
SUSPENSION, DROPS(FINAL DOSAGE FORM)(ML) ORAL
Status: DISPENSED
Start: 2025-02-18

## (undated) RX ORDER — ONDANSETRON 4 MG/1
TABLET, ORALLY DISINTEGRATING ORAL
Status: DISPENSED
Start: 2024-12-05

## (undated) RX ORDER — METRONIDAZOLE 500 MG/100ML
INJECTION, SOLUTION INTRAVENOUS
Status: DISPENSED
Start: 2025-01-06

## (undated) RX ORDER — KETOROLAC TROMETHAMINE 30 MG/ML
INJECTION, SOLUTION INTRAMUSCULAR; INTRAVENOUS
Status: DISPENSED
Start: 2025-01-06

## (undated) RX ORDER — ONDANSETRON 2 MG/ML
INJECTION INTRAMUSCULAR; INTRAVENOUS
Status: DISPENSED
Start: 2025-01-06

## (undated) RX ORDER — FENTANYL CITRATE 50 UG/ML
INJECTION, SOLUTION INTRAMUSCULAR; INTRAVENOUS
Status: DISPENSED
Start: 2025-01-06

## (undated) RX ORDER — OXYCODONE HYDROCHLORIDE 5 MG/1
TABLET ORAL
Status: DISPENSED
Start: 2025-01-06